# Patient Record
Sex: FEMALE | Race: WHITE | Employment: OTHER | URBAN - METROPOLITAN AREA
[De-identification: names, ages, dates, MRNs, and addresses within clinical notes are randomized per-mention and may not be internally consistent; named-entity substitution may affect disease eponyms.]

---

## 2021-10-26 ENCOUNTER — APPOINTMENT (OUTPATIENT)
Dept: GENERAL RADIOLOGY | Age: 86
DRG: 291 | End: 2021-10-26
Payer: MEDICARE

## 2021-10-26 ENCOUNTER — HOSPITAL ENCOUNTER (INPATIENT)
Age: 86
LOS: 5 days | Discharge: NURSING, REHAB OR LONG-TERM CARE | DRG: 291 | End: 2021-10-31
Attending: EMERGENCY MEDICINE | Admitting: INTERNAL MEDICINE
Payer: MEDICARE

## 2021-10-26 ENCOUNTER — APPOINTMENT (OUTPATIENT)
Dept: CT IMAGING | Age: 86
DRG: 291 | End: 2021-10-26
Payer: MEDICARE

## 2021-10-26 DIAGNOSIS — J96.01 ACUTE HYPOXEMIC RESPIRATORY FAILURE (HCC): Primary | ICD-10-CM

## 2021-10-26 DIAGNOSIS — J18.9 PNEUMONIA DUE TO INFECTIOUS ORGANISM, UNSPECIFIED LATERALITY, UNSPECIFIED PART OF LUNG: ICD-10-CM

## 2021-10-26 DIAGNOSIS — I48.91 ATRIAL FIBRILLATION, UNSPECIFIED TYPE (HCC): ICD-10-CM

## 2021-10-26 DIAGNOSIS — J90 PLEURAL EFFUSION: ICD-10-CM

## 2021-10-26 PROBLEM — J96.00 ACUTE RESPIRATORY FAILURE (HCC): Status: ACTIVE | Noted: 2021-10-26

## 2021-10-26 LAB
A/G RATIO: 0.9 (ref 1.1–2.2)
ALBUMIN SERPL-MCNC: 3.6 G/DL (ref 3.4–5)
ALP BLD-CCNC: 140 U/L (ref 40–129)
ALT SERPL-CCNC: 11 U/L (ref 10–40)
ANION GAP SERPL CALCULATED.3IONS-SCNC: 10 MMOL/L (ref 3–16)
AST SERPL-CCNC: 21 U/L (ref 15–37)
BASE EXCESS VENOUS: -2.2 MMOL/L (ref -3–3)
BASOPHILS ABSOLUTE: 0.1 K/UL (ref 0–0.2)
BASOPHILS RELATIVE PERCENT: 0.5 %
BILIRUB SERPL-MCNC: 0.5 MG/DL (ref 0–1)
BUN BLDV-MCNC: 20 MG/DL (ref 7–20)
CALCIUM SERPL-MCNC: 8.8 MG/DL (ref 8.3–10.6)
CARBOXYHEMOGLOBIN: 4.2 % (ref 0–1.5)
CHLORIDE BLD-SCNC: 103 MMOL/L (ref 99–110)
CO2: 22 MMOL/L (ref 21–32)
CREAT SERPL-MCNC: 0.6 MG/DL (ref 0.6–1.2)
EOSINOPHILS ABSOLUTE: 0.1 K/UL (ref 0–0.6)
EOSINOPHILS RELATIVE PERCENT: 0.4 %
GFR AFRICAN AMERICAN: >60
GFR NON-AFRICAN AMERICAN: >60
GLOBULIN: 4.1 G/DL
GLUCOSE BLD-MCNC: 161 MG/DL (ref 70–99)
HCO3 VENOUS: 22.4 MMOL/L (ref 23–29)
HCT VFR BLD CALC: 26.5 % (ref 36–48)
HEMOGLOBIN: 8.2 G/DL (ref 12–16)
LACTIC ACID, SEPSIS: 0.9 MMOL/L (ref 0.4–1.9)
LACTIC ACID: 2.7 MMOL/L (ref 0.4–2)
LYMPHOCYTES ABSOLUTE: 0.7 K/UL (ref 1–5.1)
LYMPHOCYTES RELATIVE PERCENT: 5 %
MCH RBC QN AUTO: 24.3 PG (ref 26–34)
MCHC RBC AUTO-ENTMCNC: 30.9 G/DL (ref 31–36)
MCV RBC AUTO: 78.7 FL (ref 80–100)
METHEMOGLOBIN VENOUS: 0.3 %
MONOCYTES ABSOLUTE: 1.3 K/UL (ref 0–1.3)
MONOCYTES RELATIVE PERCENT: 9.7 %
NEUTROPHILS ABSOLUTE: 11.7 K/UL (ref 1.7–7.7)
NEUTROPHILS RELATIVE PERCENT: 84.4 %
O2 SAT, VEN: 98 %
O2 THERAPY: ABNORMAL
PCO2, VEN: 37.5 MMHG (ref 40–50)
PDW BLD-RTO: 18.1 % (ref 12.4–15.4)
PH VENOUS: 7.39 (ref 7.35–7.45)
PLATELET # BLD: 353 K/UL (ref 135–450)
PMV BLD AUTO: 7.5 FL (ref 5–10.5)
PO2, VEN: 136.7 MMHG (ref 25–40)
POTASSIUM REFLEX MAGNESIUM: 4.1 MMOL/L (ref 3.5–5.1)
PRO-BNP: 2294 PG/ML (ref 0–449)
RBC # BLD: 3.37 M/UL (ref 4–5.2)
SARS-COV-2, NAAT: NOT DETECTED
SODIUM BLD-SCNC: 135 MMOL/L (ref 136–145)
SPECIMEN STATUS: NORMAL
TCO2 CALC VENOUS: 24 MMOL/L
TOTAL PROTEIN: 7.7 G/DL (ref 6.4–8.2)
TROPONIN: <0.01 NG/ML
WBC # BLD: 13.9 K/UL (ref 4–11)

## 2021-10-26 PROCEDURE — 2580000003 HC RX 258: Performed by: EMERGENCY MEDICINE

## 2021-10-26 PROCEDURE — 85025 COMPLETE CBC W/AUTO DIFF WBC: CPT

## 2021-10-26 PROCEDURE — 99284 EMERGENCY DEPT VISIT MOD MDM: CPT

## 2021-10-26 PROCEDURE — 6360000004 HC RX CONTRAST MEDICATION: Performed by: EMERGENCY MEDICINE

## 2021-10-26 PROCEDURE — U0003 INFECTIOUS AGENT DETECTION BY NUCLEIC ACID (DNA OR RNA); SEVERE ACUTE RESPIRATORY SYNDROME CORONAVIRUS 2 (SARS-COV-2) (CORONAVIRUS DISEASE [COVID-19]), AMPLIFIED PROBE TECHNIQUE, MAKING USE OF HIGH THROUGHPUT TECHNOLOGIES AS DESCRIBED BY CMS-2020-01-R: HCPCS

## 2021-10-26 PROCEDURE — 71260 CT THORAX DX C+: CPT

## 2021-10-26 PROCEDURE — 84484 ASSAY OF TROPONIN QUANT: CPT

## 2021-10-26 PROCEDURE — 93005 ELECTROCARDIOGRAM TRACING: CPT | Performed by: EMERGENCY MEDICINE

## 2021-10-26 PROCEDURE — 82803 BLOOD GASES ANY COMBINATION: CPT

## 2021-10-26 PROCEDURE — 70450 CT HEAD/BRAIN W/O DYE: CPT

## 2021-10-26 PROCEDURE — 71045 X-RAY EXAM CHEST 1 VIEW: CPT

## 2021-10-26 PROCEDURE — U0005 INFEC AGEN DETEC AMPLI PROBE: HCPCS

## 2021-10-26 PROCEDURE — 94761 N-INVAS EAR/PLS OXIMETRY MLT: CPT

## 2021-10-26 PROCEDURE — 2060000000 HC ICU INTERMEDIATE R&B

## 2021-10-26 PROCEDURE — 2700000000 HC OXYGEN THERAPY PER DAY

## 2021-10-26 PROCEDURE — 83880 ASSAY OF NATRIURETIC PEPTIDE: CPT

## 2021-10-26 PROCEDURE — 80053 COMPREHEN METABOLIC PANEL: CPT

## 2021-10-26 PROCEDURE — 6360000002 HC RX W HCPCS: Performed by: EMERGENCY MEDICINE

## 2021-10-26 PROCEDURE — 83605 ASSAY OF LACTIC ACID: CPT

## 2021-10-26 PROCEDURE — 87635 SARS-COV-2 COVID-19 AMP PRB: CPT

## 2021-10-26 PROCEDURE — 87040 BLOOD CULTURE FOR BACTERIA: CPT

## 2021-10-26 RX ORDER — SODIUM CHLORIDE, SODIUM LACTATE, POTASSIUM CHLORIDE, AND CALCIUM CHLORIDE .6; .31; .03; .02 G/100ML; G/100ML; G/100ML; G/100ML
1000 INJECTION, SOLUTION INTRAVENOUS ONCE
Status: COMPLETED | OUTPATIENT
Start: 2021-10-26 | End: 2021-10-26

## 2021-10-26 RX ORDER — SODIUM CHLORIDE 0.9 % (FLUSH) 0.9 %
5-40 SYRINGE (ML) INJECTION PRN
Status: DISCONTINUED | OUTPATIENT
Start: 2021-10-26 | End: 2021-10-31 | Stop reason: HOSPADM

## 2021-10-26 RX ORDER — SODIUM CHLORIDE 9 MG/ML
25 INJECTION, SOLUTION INTRAVENOUS PRN
Status: DISCONTINUED | OUTPATIENT
Start: 2021-10-26 | End: 2021-10-31 | Stop reason: HOSPADM

## 2021-10-26 RX ORDER — SODIUM CHLORIDE 0.9 % (FLUSH) 0.9 %
5-40 SYRINGE (ML) INJECTION EVERY 12 HOURS SCHEDULED
Status: DISCONTINUED | OUTPATIENT
Start: 2021-10-26 | End: 2021-10-31 | Stop reason: HOSPADM

## 2021-10-26 RX ORDER — LEVOFLOXACIN 5 MG/ML
750 INJECTION, SOLUTION INTRAVENOUS ONCE
Status: DISCONTINUED | OUTPATIENT
Start: 2021-10-26 | End: 2021-10-26 | Stop reason: CLARIF

## 2021-10-26 RX ADMIN — SODIUM CHLORIDE, POTASSIUM CHLORIDE, SODIUM LACTATE AND CALCIUM CHLORIDE 1000 ML: 600; 310; 30; 20 INJECTION, SOLUTION INTRAVENOUS at 22:31

## 2021-10-26 RX ADMIN — IOPAMIDOL 75 ML: 755 INJECTION, SOLUTION INTRAVENOUS at 23:42

## 2021-10-26 RX ADMIN — LEVOFLOXACIN 750 MG: 5 INJECTION, SOLUTION INTRAVENOUS at 22:34

## 2021-10-26 RX ADMIN — CEFEPIME 2000 MG: 2 INJECTION, POWDER, FOR SOLUTION INTRAMUSCULAR; INTRAVENOUS at 23:01

## 2021-10-26 NOTE — ED NOTES
Fall risk screening completed.  Fall risk bracelet applied to patient.  Non-skid socks provided and placed on patient. Reza Gifford fall risk is indicated using  dome light .  Based on score, a bed alarm was indicated and applied.  The call light is within the patient's reach, and instructions for use were provided.  The bed is in the lowest position with wheels locked.  The patient has been advised to notify staff, using the call light, if there is a need to get up or use restroom.  The patient verbalized understanding of safety precautions and how to contact staff for assistance.          Sai Vazquez RN  10/26/21 8904

## 2021-10-26 NOTE — ED NOTES
Bed: 01  Expected date:   Expected time:   Means of arrival:   Comments:   Sweetie Vazquez RN  10/26/21 1910

## 2021-10-27 LAB
A/G RATIO: 0.9 (ref 1.1–2.2)
ALBUMIN SERPL-MCNC: 3.7 G/DL (ref 3.4–5)
ALP BLD-CCNC: 138 U/L (ref 40–129)
ALT SERPL-CCNC: 12 U/L (ref 10–40)
AMMONIA: 17 UMOL/L (ref 11–51)
ANION GAP SERPL CALCULATED.3IONS-SCNC: 7 MMOL/L (ref 3–16)
AST SERPL-CCNC: 20 U/L (ref 15–37)
BASE EXCESS VENOUS: -0.6 MMOL/L (ref -3–3)
BASOPHILS ABSOLUTE: 0 K/UL (ref 0–0.2)
BASOPHILS RELATIVE PERCENT: 0.3 %
BILIRUB SERPL-MCNC: 0.4 MG/DL (ref 0–1)
BUN BLDV-MCNC: 15 MG/DL (ref 7–20)
CALCIUM SERPL-MCNC: 8.8 MG/DL (ref 8.3–10.6)
CARBOXYHEMOGLOBIN: 1.9 % (ref 0–1.5)
CHLORIDE BLD-SCNC: 103 MMOL/L (ref 99–110)
CO2: 25 MMOL/L (ref 21–32)
CREAT SERPL-MCNC: <0.5 MG/DL (ref 0.6–1.2)
EOSINOPHILS ABSOLUTE: 0.1 K/UL (ref 0–0.6)
EOSINOPHILS RELATIVE PERCENT: 1 %
FERRITIN: 66.8 NG/ML (ref 15–150)
FOLATE: 14.25 NG/ML (ref 4.78–24.2)
GFR AFRICAN AMERICAN: >60
GFR NON-AFRICAN AMERICAN: >60
GLOBULIN: 3.9 G/DL
GLUCOSE BLD-MCNC: 98 MG/DL (ref 70–99)
HCO3 VENOUS: 25.5 MMOL/L (ref 23–29)
HCT VFR BLD CALC: 26 % (ref 36–48)
HEMOGLOBIN: 8.1 G/DL (ref 12–16)
IRON SATURATION: 5 % (ref 15–50)
IRON: 14 UG/DL (ref 37–145)
LACTIC ACID, SEPSIS: 1 MMOL/L (ref 0.4–1.9)
LYMPHOCYTES ABSOLUTE: 0.9 K/UL (ref 1–5.1)
LYMPHOCYTES RELATIVE PERCENT: 9.4 %
MAGNESIUM: 2 MG/DL (ref 1.8–2.4)
MCH RBC QN AUTO: 24.7 PG (ref 26–34)
MCHC RBC AUTO-ENTMCNC: 31.1 G/DL (ref 31–36)
MCV RBC AUTO: 79.6 FL (ref 80–100)
METHEMOGLOBIN VENOUS: 0.6 %
MONOCYTES ABSOLUTE: 0.9 K/UL (ref 0–1.3)
MONOCYTES RELATIVE PERCENT: 9.4 %
NEUTROPHILS ABSOLUTE: 7.4 K/UL (ref 1.7–7.7)
NEUTROPHILS RELATIVE PERCENT: 79.9 %
O2 SAT, VEN: 60 %
O2 THERAPY: ABNORMAL
PCO2, VEN: 49.1 MMHG (ref 40–50)
PDW BLD-RTO: 17.8 % (ref 12.4–15.4)
PH VENOUS: 7.33 (ref 7.35–7.45)
PLATELET # BLD: 315 K/UL (ref 135–450)
PMV BLD AUTO: 7.1 FL (ref 5–10.5)
PO2, VEN: 34.6 MMHG (ref 25–40)
POTASSIUM REFLEX MAGNESIUM: 3.5 MMOL/L (ref 3.5–5.1)
PROCALCITONIN: 0.12 NG/ML (ref 0–0.15)
RAPID INFLUENZA  B AGN: NEGATIVE
RAPID INFLUENZA A AGN: NEGATIVE
RBC # BLD: 3.27 M/UL (ref 4–5.2)
SARS-COV-2: NOT DETECTED
SODIUM BLD-SCNC: 135 MMOL/L (ref 136–145)
TCO2 CALC VENOUS: 27 MMOL/L
TOTAL IRON BINDING CAPACITY: 286 UG/DL (ref 260–445)
TOTAL PROTEIN: 7.6 G/DL (ref 6.4–8.2)
VITAMIN B-12: 630 PG/ML (ref 211–911)
WBC # BLD: 9.3 K/UL (ref 4–11)

## 2021-10-27 PROCEDURE — 84145 PROCALCITONIN (PCT): CPT

## 2021-10-27 PROCEDURE — 80053 COMPREHEN METABOLIC PANEL: CPT

## 2021-10-27 PROCEDURE — 6360000002 HC RX W HCPCS: Performed by: EMERGENCY MEDICINE

## 2021-10-27 PROCEDURE — 6370000000 HC RX 637 (ALT 250 FOR IP): Performed by: INTERNAL MEDICINE

## 2021-10-27 PROCEDURE — 2700000000 HC OXYGEN THERAPY PER DAY

## 2021-10-27 PROCEDURE — 2060000000 HC ICU INTERMEDIATE R&B

## 2021-10-27 PROCEDURE — 94640 AIRWAY INHALATION TREATMENT: CPT

## 2021-10-27 PROCEDURE — 85025 COMPLETE CBC W/AUTO DIFF WBC: CPT

## 2021-10-27 PROCEDURE — 82728 ASSAY OF FERRITIN: CPT

## 2021-10-27 PROCEDURE — 2580000003 HC RX 258: Performed by: INTERNAL MEDICINE

## 2021-10-27 PROCEDURE — 82803 BLOOD GASES ANY COMBINATION: CPT

## 2021-10-27 PROCEDURE — 83550 IRON BINDING TEST: CPT

## 2021-10-27 PROCEDURE — 83540 ASSAY OF IRON: CPT

## 2021-10-27 PROCEDURE — 83605 ASSAY OF LACTIC ACID: CPT

## 2021-10-27 PROCEDURE — 82140 ASSAY OF AMMONIA: CPT

## 2021-10-27 PROCEDURE — 36415 COLL VENOUS BLD VENIPUNCTURE: CPT

## 2021-10-27 PROCEDURE — 6360000002 HC RX W HCPCS: Performed by: INTERNAL MEDICINE

## 2021-10-27 PROCEDURE — 2580000003 HC RX 258: Performed by: EMERGENCY MEDICINE

## 2021-10-27 PROCEDURE — 83735 ASSAY OF MAGNESIUM: CPT

## 2021-10-27 PROCEDURE — 94660 CPAP INITIATION&MGMT: CPT

## 2021-10-27 PROCEDURE — 94761 N-INVAS EAR/PLS OXIMETRY MLT: CPT

## 2021-10-27 PROCEDURE — 87804 INFLUENZA ASSAY W/OPTIC: CPT

## 2021-10-27 PROCEDURE — 82607 VITAMIN B-12: CPT

## 2021-10-27 PROCEDURE — 82746 ASSAY OF FOLIC ACID SERUM: CPT

## 2021-10-27 RX ORDER — MAGNESIUM SULFATE IN WATER 40 MG/ML
2000 INJECTION, SOLUTION INTRAVENOUS PRN
Status: DISCONTINUED | OUTPATIENT
Start: 2021-10-27 | End: 2021-10-31 | Stop reason: HOSPADM

## 2021-10-27 RX ORDER — POTASSIUM CHLORIDE 7.45 MG/ML
10 INJECTION INTRAVENOUS PRN
Status: DISCONTINUED | OUTPATIENT
Start: 2021-10-27 | End: 2021-10-29

## 2021-10-27 RX ORDER — FUROSEMIDE 10 MG/ML
40 INJECTION INTRAMUSCULAR; INTRAVENOUS 2 TIMES DAILY
Status: DISCONTINUED | OUTPATIENT
Start: 2021-10-27 | End: 2021-10-27

## 2021-10-27 RX ORDER — TAMSULOSIN HYDROCHLORIDE 0.4 MG/1
0.4 CAPSULE ORAL DAILY
Status: DISCONTINUED | OUTPATIENT
Start: 2021-10-27 | End: 2021-10-31 | Stop reason: HOSPADM

## 2021-10-27 RX ORDER — LEVOTHYROXINE SODIUM 0.05 MG/1
50 TABLET ORAL NIGHTLY
Status: DISCONTINUED | OUTPATIENT
Start: 2021-10-27 | End: 2021-10-31 | Stop reason: HOSPADM

## 2021-10-27 RX ORDER — FERROUS SULFATE 325(65) MG
325 TABLET ORAL
Status: DISCONTINUED | OUTPATIENT
Start: 2021-10-28 | End: 2021-10-31 | Stop reason: HOSPADM

## 2021-10-27 RX ORDER — DIGOXIN 0.25 MG/ML
500 INJECTION INTRAMUSCULAR; INTRAVENOUS ONCE
Status: COMPLETED | OUTPATIENT
Start: 2021-10-27 | End: 2021-10-27

## 2021-10-27 RX ORDER — ACETAMINOPHEN 650 MG/1
650 SUPPOSITORY RECTAL EVERY 6 HOURS PRN
Status: DISCONTINUED | OUTPATIENT
Start: 2021-10-27 | End: 2021-10-31 | Stop reason: HOSPADM

## 2021-10-27 RX ORDER — SODIUM CHLORIDE 0.9 % (FLUSH) 0.9 %
10 SYRINGE (ML) INJECTION PRN
Status: DISCONTINUED | OUTPATIENT
Start: 2021-10-27 | End: 2021-10-31 | Stop reason: HOSPADM

## 2021-10-27 RX ORDER — GUAIFENESIN 600 MG/1
600 TABLET, EXTENDED RELEASE ORAL 2 TIMES DAILY
Status: DISCONTINUED | OUTPATIENT
Start: 2021-10-27 | End: 2021-10-31 | Stop reason: HOSPADM

## 2021-10-27 RX ORDER — PROMETHAZINE HYDROCHLORIDE 25 MG/1
12.5 TABLET ORAL EVERY 6 HOURS PRN
Status: DISCONTINUED | OUTPATIENT
Start: 2021-10-27 | End: 2021-10-31 | Stop reason: HOSPADM

## 2021-10-27 RX ORDER — MONTELUKAST SODIUM 10 MG/1
10 TABLET ORAL NIGHTLY
Status: DISCONTINUED | OUTPATIENT
Start: 2021-10-27 | End: 2021-10-31 | Stop reason: HOSPADM

## 2021-10-27 RX ORDER — POLYETHYLENE GLYCOL 3350 17 G/17G
17 POWDER, FOR SOLUTION ORAL DAILY
Status: DISCONTINUED | OUTPATIENT
Start: 2021-10-27 | End: 2021-10-31 | Stop reason: HOSPADM

## 2021-10-27 RX ORDER — FUROSEMIDE 10 MG/ML
20 INJECTION INTRAMUSCULAR; INTRAVENOUS ONCE
Status: COMPLETED | OUTPATIENT
Start: 2021-10-27 | End: 2021-10-27

## 2021-10-27 RX ORDER — ALBUTEROL SULFATE 90 UG/1
2 AEROSOL, METERED RESPIRATORY (INHALATION) EVERY 6 HOURS PRN
Status: DISCONTINUED | OUTPATIENT
Start: 2021-10-27 | End: 2021-10-31 | Stop reason: HOSPADM

## 2021-10-27 RX ORDER — BUDESONIDE AND FORMOTEROL FUMARATE DIHYDRATE 160; 4.5 UG/1; UG/1
2 AEROSOL RESPIRATORY (INHALATION) 2 TIMES DAILY
Status: DISCONTINUED | OUTPATIENT
Start: 2021-10-27 | End: 2021-10-31 | Stop reason: HOSPADM

## 2021-10-27 RX ORDER — ONDANSETRON 2 MG/ML
4 INJECTION INTRAMUSCULAR; INTRAVENOUS EVERY 6 HOURS PRN
Status: DISCONTINUED | OUTPATIENT
Start: 2021-10-27 | End: 2021-10-31 | Stop reason: HOSPADM

## 2021-10-27 RX ORDER — SODIUM CHLORIDE 9 MG/ML
25 INJECTION, SOLUTION INTRAVENOUS PRN
Status: DISCONTINUED | OUTPATIENT
Start: 2021-10-27 | End: 2021-10-31 | Stop reason: HOSPADM

## 2021-10-27 RX ORDER — ACETAMINOPHEN 325 MG/1
650 TABLET ORAL EVERY 6 HOURS PRN
Status: DISCONTINUED | OUTPATIENT
Start: 2021-10-27 | End: 2021-10-31 | Stop reason: HOSPADM

## 2021-10-27 RX ORDER — SODIUM CHLORIDE 0.9 % (FLUSH) 0.9 %
10 SYRINGE (ML) INJECTION EVERY 12 HOURS SCHEDULED
Status: DISCONTINUED | OUTPATIENT
Start: 2021-10-27 | End: 2021-10-31 | Stop reason: HOSPADM

## 2021-10-27 RX ADMIN — GUAIFENESIN 600 MG: 600 TABLET, EXTENDED RELEASE ORAL at 08:23

## 2021-10-27 RX ADMIN — DIGOXIN 500 MCG: 250 INJECTION, SOLUTION INTRAMUSCULAR; INTRAVENOUS; PARENTERAL at 04:35

## 2021-10-27 RX ADMIN — LEVETIRACETAM 500 MG: 100 INJECTION, SOLUTION INTRAVENOUS at 21:41

## 2021-10-27 RX ADMIN — TIOTROPIUM BROMIDE INHALATION SPRAY 2 PUFF: 3.12 SPRAY, METERED RESPIRATORY (INHALATION) at 08:00

## 2021-10-27 RX ADMIN — MONTELUKAST SODIUM 10 MG: 10 TABLET ORAL at 19:37

## 2021-10-27 RX ADMIN — SALINE NASAL SPRAY 1 SPRAY: 1.5 SOLUTION NASAL at 18:01

## 2021-10-27 RX ADMIN — Medication 2 PUFF: at 08:00

## 2021-10-27 RX ADMIN — GUAIFENESIN 600 MG: 600 TABLET, EXTENDED RELEASE ORAL at 19:36

## 2021-10-27 RX ADMIN — Medication 2 PUFF: at 19:42

## 2021-10-27 RX ADMIN — LEVOTHYROXINE SODIUM 50 MCG: 0.05 TABLET ORAL at 19:37

## 2021-10-27 RX ADMIN — LEVETIRACETAM 1000 MG: 500 INJECTION, SOLUTION, CONCENTRATE INTRAVENOUS at 04:51

## 2021-10-27 RX ADMIN — LEVETIRACETAM 500 MG: 100 INJECTION, SOLUTION INTRAVENOUS at 08:08

## 2021-10-27 RX ADMIN — FUROSEMIDE 5 MG/HR: 10 INJECTION INTRAMUSCULAR; INTRAVENOUS at 05:21

## 2021-10-27 RX ADMIN — POLYETHYLENE GLYCOL 3350 17 G: 17 POWDER, FOR SOLUTION ORAL at 18:01

## 2021-10-27 RX ADMIN — ENOXAPARIN SODIUM 30 MG: 30 INJECTION SUBCUTANEOUS at 08:23

## 2021-10-27 RX ADMIN — VANCOMYCIN HYDROCHLORIDE 750 MG: 750 INJECTION, POWDER, LYOPHILIZED, FOR SOLUTION INTRAVENOUS at 00:09

## 2021-10-27 RX ADMIN — TAMSULOSIN HYDROCHLORIDE 0.4 MG: 0.4 CAPSULE ORAL at 08:23

## 2021-10-27 RX ADMIN — FUROSEMIDE 20 MG: 10 INJECTION, SOLUTION INTRAMUSCULAR; INTRAVENOUS at 04:35

## 2021-10-27 RX ADMIN — Medication 2 PUFF: at 05:45

## 2021-10-27 ASSESSMENT — PAIN SCALES - GENERAL
PAINLEVEL_OUTOF10: 0
PAINLEVEL_OUTOF10: 0

## 2021-10-27 NOTE — PROGRESS NOTES
[facility]  PROCEDURE NOTE    PREOPERATIVE DIAGNOSIS:  1.  Lipoma of the posterior neck    PREPROCEDURE PATHOLOGY:   none    POSTOPERATIVE DIAGNOSIS:   1.  Subfascial lipoma of the posterior neck    PROCEDURE PERFORMED:  1.  Excision of Subfascial lipoma from the posterior neck, 2.5 cm.    Surgeon:  Triston Joseph MD     Anesthesia:  1% lidocaine with epinephrine, 9.0 mL    Skin Preparation:  Chloraprep    Complications:  None     Estimated Blood Loss: None     Pre-procedure Antibiotics:  None     Specimens:   1. posterior neck mass      The lesion was placed directly into a specimen container(s), marked with the patient's name and sent for pathologic diagnosis.    Findings:  Moderately firm lipomatous mass of the posterior neck, deep to nataliya's fascia, resting directly on the posterior neck superficial musculature    PATIENT COUNSELING  Patient referred by self. The problematic lesion was identified with a mirror and confirmed by the patient. I discussed the recommended surgical options with the patient.  I offered observatino or excision of the Lipoma and closure.  The incisions and follow up care were discussed in detail with the patient.     Risks and benefits of Lipoma excision were discussed in detail with the patient.  Risks of excision include but are not limited to: bleeding, infection, poor cosmesis, necrosis of tissue, poor wound healing that requires additional wound care or surgery, pain, numbness, and injury to nerves, bloods vessels or adjacent structures.      It was discussed with the patient that if the pathologic margins still demonstrate cancer, further excision would be required.  In addition the risk of recurrence of the  Lipomawas discussed with the patient.      It was explained to the patient that all surgical excisions result in a permanent scar. On average scars take a year to fully mature.  After full maturation additional procedures or scar revisions may  Patient admitted to room 422 from ER. Patient oriented to room, call light, bed rails, phone, lights and bathroom. Patient instructed about the schedule of the day including: vital sign frequency, lab draws, possible tests, frequency of MD and staff rounds, including RN/MD rounding together at bedside, daily weights, and I &O's. Patient instructed about prescribed diet, how to use 8MENU, and television. bed alarm in place, patient aware of placement and reason. Telemetry box in place, patient aware of placement and reason. Bed locked, in lowest position, side rails up 2/4, call light within reach. Will continue to monitor. need to be performed.  Alternatives of the procedure were also discussed with the patient which include no excision.  All of the patient's questions were answered.         DESCRIPTION OF PROCEDURE:    The patient rested in the prone position.  Measurements and markings were made as indicated in the findings section above.  Anesthesia was administered followed by skin preparation.  A scalpel was used to incise the skin.  Sharp dissection was used to deepen the dissection into the deep subcutaneous layer and then t superficial musculaturehrough scarp's fascia.  The entire lesion was removed.  The wound was irrigated with sterile saline solution and hemostasis was obtained with pressure and minor cautery as needed.  Brian's fascia was closed with interrupted monocryl suture.   Tissue closure followed by placing 3-0 monocryl in the deep dermis in an interrupted fashion.  Skin closure was completed by placing 4-0 monocryl in a running intra-cuticular fashion.       Sterile dressings were applied and the patient was given both verbal and written forms of wound care instructions.      PLAN:   · We will call the patient with pathology results in 7-10 days.   · The patient will not require a follow-up in our office for suture removal.  · The patient will require a follow-up with dermatology and was instructed to make an appointment.

## 2021-10-27 NOTE — H&P
Hospital Medicine History & Physical      PCP: Anya Amador MD    Date of Admission: 10/26/2021    Date of Service: Pt seen/examined on 10/26/2021  Pt seen/examined face to face on and admitted as inpatient with expected LOS greater than two midnights due to medical therapy  Chief Complaint:    Chief Complaint   Patient presents with    Shortness of Breath     HX AFIB; CHF; COPD; FROM THE EH; GIVEN DUO NEB IN [place]        History Of Present Illness:      80 y.o. female who presented to [facility] with past medical hypertension, hyperlipidemia, osteoporosis, seizure disorder, hypothyroidism, CAD, presents to the ED due to worsening acute respiratory failure. Patient came from outside facility encephalopathic thus history is limited. Per chart review patient has been having worsening hypoxia satting in 60% when EMS arrived. Patient was eval patient had consistent hypoxia however was responsive to oxygen. On patient arrival patient was placed on BiPAP. Past Medical History:          Diagnosis Date    Anemia     C. difficile diarrhea     CAD (coronary artery disease)     CHF (congestive heart failure) (HCC)     COPD (chronic obstructive pulmonary disease) (HCC)     Depression     Hyperlipidemia     Hypertension     Hyponatremia     Osteoporosis     Seizures (HCC)     Thyroid disease     hypo       Past Surgical History:          Procedure Laterality Date    ANKLE SURGERY Left 10/27/2016    OPEN REDUCTION INTERNAL FIXATION BI MALLEOLAR FRACTURE LEFT ANKLE, REPAIR OF LACERATION LEFT LOWER LEG    FRACTURE SURGERY  10/27/2016    ORIF left ankle       Medications Prior to Admission:      Prior to Admission medications    Medication Sig Start Date End Date Taking?  Authorizing Provider   vitamin D (ERGOCALCIFEROL) 88981 UNITS capsule Take 1 capsule by mouth every 30 days 10/27/16   Best Landa MD   tamsulosin (FLOMAX) 0.4 MG capsule Take 1 capsule by mouth daily Pulse 121   Temp 98.4 °F (36.9 °C) (Axillary)   Resp 20   Wt 112 lb (50.8 kg)   SpO2 92%   BMI 18.08 kg/m²     General appearance:  mild acute distress, appears older than stated age  HEENT:   atraumatic, sclera anicteric, Conjunctivae clear. Neck: Supple,Trachea midline, no goiter  Respiratory:minimal accessory muscle usage, Normal respiratory effort. Crackles bilaterally, on BiPAP  Cardiovascular:  R irregularly irregular, capillary refill 2 seconds  Abdomen: Soft, non-tender, non-distended with normal bowel sounds. Musculoskeletal:  No clubbing, cyanosis. trace edema LE bilaterally. Skin: turgor normal.  No new rashes or lesions. Neurologic: Alert and oriented x0, somnolent but arouses to painful stimuli. GCS 9.   Labs:     Recent Labs     10/26/21  1914   WBC 13.9*   HGB 8.2*   HCT 26.5*        Recent Labs     10/26/21  1914   *   K 4.1      CO2 22   BUN 20   CREATININE 0.6   CALCIUM 8.8     Recent Labs     10/26/21  1914   AST 21   ALT 11   BILITOT 0.5   ALKPHOS 140*     No results for input(s): INR in the last 72 hours. Recent Labs     10/26/21  1914   TROPONINI <0.01       Urinalysis:      Lab Results   Component Value Date    NITRU Negative 10/27/2016    WBCUA 10-20 10/27/2016    BACTERIA 4+ 10/27/2016    RBCUA 0-2 10/27/2016    BLOODU SMALL 10/27/2016    SPECGRAV 1.020 10/27/2016    GLUCOSEU Negative 10/27/2016       Radiology:     CXR: I have reviewed the CXR with the following interpretation:   Perihilar edema and CHF  EKG:  I have reviewed the EKG with the following interpretation:   A. fib rate uncontrolled    CT HEAD WO CONTRAST   Final Result   No acute intracranial abnormality. CT CHEST PULMONARY EMBOLISM W CONTRAST   Final Result   1. No scan evidence for pulmonary embolus. 2. Bilateral pleural effusions with complete collapse of both lower lobes as   well as the middle lobe. XR CHEST PORTABLE   Final Result   Probable perihilar edema and CHF. Bilateral pleural effusions, right greater   than left with associated bibasilar volume loss. Underlying infiltrate not   excluded. ASSESSMENT AND PLAN:    Active Hospital Problems    Diagnosis Date Noted    Acute respiratory failure ([facility] [address] [J96.00] 10/26/2021     Acute encephalopathy:  etiology suspect metabolic in the setting of acute respiratory failure  CT head negative  Labs pending    Acute hypoxic respiratory failure: Secondary to pleural effusions  CTA showing no PE  Covid 19, flu rule out  Patient responded to BiPAP  Continue to monitor and wean off    Pleural effusions:  Etiology suspected cardiac  Empirically being treated with antibiotics pending pro-Bobo  IV Lasix, abx  check response    Suspected acute HFpEF:  Patient last echo was 2014 and was normal  Strict I's and O's, Daily weights  Lasix drip  Continued home medications  Heart failure protocol set ordered  Continue to check response    A. fib with RVR:  Suspected secondary to secondary to CHF  Patient given hemodynamically low borderline BP not anticoagulated with adequate renal function.   Will order digoxin for rate control at this time  Cardiology consulted, much appreciated    Microcytic anemia:  anemia panel ordered next    Malnutrition:  Dietitian consulted    Chronic medical conditions:  Hypothyroidism: Continue home medication  Asthma: continue home medication  Seizure: Restart home medication    Diet: cardiac diet    DVT Prophylaxis: lovenox    Dispo:   Expected LOS greater than two [address] [facility], [place]

## 2021-10-27 NOTE — CONSULTS
Comprehensive Nutrition Assessment    Type and Reason for Visit:  Initial, Positive Nutrition Screen, Consult    Nutrition Recommendations/Plan:   1. Continue regular, JAMES diet w/2000ml FR  2. Encourage po intakes  3. Start Ensure Compact TID   4. Monitor po intakes, nutrition adequacy, weights, pertinent labs, BMs    Nutrition Assessment:  Consulted for CHF education and oral nutritional supplements. CHF education not appropriate at this time d/t resident residing in [facility]. Pt 81yo female admitted 10/25 r/t acute respiratory failure. Currently on contact  and droplet precautions. Attempted to contact pt, sleeping at this time. Pt currently on regular, JAMES diet w/2000ml FR - liberalized from reg, carb control, cardiac d/t pt not having hx of  DM  and to promote po intake. Pt nurse reported no intakes with breakfast tray, stating pt was very lethergic this morning. Ensure Compact added TID. Will monitor acceptance. Wt = 101#. CLARISSE wt status d/t no wt hx in EMR. No reports of N/V/D. Will continue to monitor. Malnutrition Assessment:  Malnutrition Status: At risk for malnutrition (Comment)    Context:  Acute Illness     Findings of the 6 clinical characteristics of malnutrition:  Energy Intake:  Mild decrease in energy intake (Comment)    Estimated Daily Nutrient Needs:  Energy (kcal):  1782- 2070; Weight Used for Energy Requirements:  Ideal (59.1kg)     Protein (g):  71-89; Weight Used for Protein Requirements:   (1.2-1.5)        Fluid (ml/day):  2000ml per CHF recommendations;       Nutrition Related Findings:  trace, pitting BLE edema. Na 135. 6L O2. Wounds:  None (redness to sacrum)       Current Nutrition Therapies:    ADULT ORAL NUTRITION SUPPLEMENT; Lunch, Dinner, Breakfast; Standard 4 oz Oral Supplement  ADULT DIET; Regular;  No Added Salt (3-4 gm); 2000 ml    Anthropometric Measures:  · Height: 5' 6\" (167.6 cm)  · Current Body Weight: 101 lb 13.6 oz (46.2 kg)    · Ideal Body Weight: 130 lbs;   · BMI: 16.4  · BMI Categories: Underweight (BMI less than 22) age over 72       Nutrition Diagnosis:   · Inadequate energy intake related to inadequate protein-energy intake as evidenced by poor intake prior to admission, intake 0-25%, BMI      Nutrition Interventions:   Food and/or Nutrient Delivery:  Modify Current Diet, Start Oral Nutrition Supplement  Nutrition Education/Counseling:  Education not appropriate (Pt resides in SNF)   Coordination of Nutrition Care:  Continue to monitor while inpatient    Goals:  Pt will consume 50% or greater of meals and ONS during this admission       Nutrition Monitoring and Evaluation:   Behavioral-Environmental Outcomes:  None Identified   Food/Nutrient Intake Outcomes:  Food and Nutrient Intake, Supplement Intake  Physical Signs/Symptoms Outcomes:  Nutrition Focused Physical Findings, Weight, Biochemical Data     Discharge Planning:     Too soon to determine     Electronically signed by Fady Edgar on 10/27/21 at 2:34 PM EDT    Contact: [phone]

## 2021-10-27 NOTE — PROGRESS NOTES
RT in room to place patient on BIPAP machine. Patient refusing says she is not wearing that silly thing.

## 2021-10-27 NOTE — PLAN OF CARE
Problem: Falls - Risk of:  Goal: Will remain free from falls  Description: Will remain free from falls  10/27/2021 1038 by Amy Sosa RN  Outcome: Ongoing  10/27/2021 0206 by Ramiro Moss RN  Outcome: Ongoing  Goal: Absence of physical injury  Description: Absence of physical injury  10/27/2021 1038 by Amy Sosa RN  Outcome: Ongoing  10/27/2021 0206 by Ramiro Moss RN  Outcome: Ongoing     Problem: Skin Integrity:  Goal: Will show no infection signs and symptoms  Description: Will show no infection signs and symptoms  10/27/2021 1038 by Amy Sosa RN  Outcome: Ongoing  10/27/2021 0206 by Ramiro Moss RN  Outcome: Ongoing  Goal: Absence of new skin breakdown  Description: Absence of new skin breakdown  10/27/2021 1038 by Amy Sosa RN  Outcome: Ongoing  10/27/2021 0206 by Ramiro Moss RN  Outcome: Ongoing

## 2021-10-27 NOTE — PLAN OF CARE
Problem: Nutrition  Goal: Optimal nutrition therapy  Outcome: Ongoing  Note: Nutrition Problem #1: Inadequate energy intake  Intervention: Food and/or Nutrient Delivery: Modify Current Diet, Start Oral Nutrition Supplement  Nutritional Goals: Pt will consume 50% or greater of meals and ONS during this admission

## 2021-10-27 NOTE — PROGRESS NOTES
Report received from [facility]. Patient resting comfortably in bed. No signs of discomfort or distress. Bed is in lowest position, wheels locked, 2/2 side rails up. Bedside table and call light within reach. White board updated. Will continue to monitor patient. Anita Cade RN    11:41 PM  Shift assessment complete. (See findings in flowsheet). Med pass complete. (See MAR). VSS. Patient with no complaints at this time. Anita Cade RN    2:22 AM  Patient resting comfortably in bed with bipap in place. VSS. No needs at this time. Anita Cade RN    6:56 AM  Lab calling with critical result, K+ 2.9. PRN replacement orders already in place.  Anita Cade RN

## 2021-10-27 NOTE — PROGRESS NOTES
Sidney Cheatham about pt's code status. \"RM: 117 FYI: I looked at pt's summary report fro nursing home. It states as of 07/05/2021 that she is a DNR-CCA. Her report will be in her chart if you would like to take a look. \"    Pt's code status updated to [facility]

## 2021-10-27 NOTE — PROGRESS NOTES
4 Eyes Skin Assessment     The patient is being assess for   Admission    I agree that 2 RN's have performed a thorough Head to Toe Skin Assessment on the patient. ALL assessment sites listed below have been assessed. Areas assessed for pressure by both nurses:   [x]   Head, Face, and Ears   [x]   Shoulders, Back, and Chest, Abdomen  [x]   Arms, Elbows, and Hands   [x]   Coccyx, Sacrum, and Ischium  [x]   Legs, Feet, and Heels        Skin Assessed Under all Medical Devices by both nurses:  O2 device tubing              All Mepilex Borders were peeled back and area peeked at by both nurses:  Yes  Please list where Mepilex Borders are located:               **SHARE this note so that the co-signing nurse is able to place an eSignature**    Co-signer eSignature: {Esignature:[id number]}    Does the Patient have Skin Breakdown related to pressure?   No     (Insert Photo here***)         Terrance Prevention initiated:  NA   Wound Care Orders initiated:  NA      WOC nurse consulted for Pressure Injury (Stage 3,4, Unstageable, DTI, NWPT, Complex wounds)and New or Established Ostomies:  NA      Primary Nurse eSignature: Electronically signed by Otto Carver RN on 10/27/21 at 2:07 AM EDT

## 2021-10-27 NOTE — PROGRESS NOTES
Hospitalist Progress Note      PCP: Lisa Duque MD    Date of Admission: 10/26/2021    Chief Complaint: SOB    Hospital Course:   80 y.o. female who presented to [facility] with past medical hypertension, hyperlipidemia, osteoporosis, seizure disorder, hypothyroidism, CAD, presents to the ED due to worsening acute respiratory failure. Patient came from outside facility encephalopathic thus history is limited. Per chart review patient has been having worsening hypoxia satting in 60% when EMS arrived. Patient was eval patient had consistent hypoxia however was responsive to oxygen. On patient arrival patient was placed on BiPAP. Subjective: SOB improved. Good diuresis with lasix gtt. Remains confused.         Medications:  Reviewed    Infusion Medications    sodium chloride      furosemide (LASIX) 1mg/ml infusion 5 mg/hr (10/27/21 0521)    sodium chloride       Scheduled Medications    sodium chloride flush  10 mL IntraVENous 2 times per day    levetiracetam  500 mg IntraVENous Q12H    enoxaparin  30 mg SubCUTAneous Daily    guaiFENesin  600 mg Oral BID    budesonide-formoterol  2 puff Inhalation BID    levothyroxine  50 mcg Oral Nightly    montelukast  10 mg Oral Nightly    tamsulosin  0.4 mg Oral Daily    tiotropium  2 puff Inhalation Daily    sodium chloride flush  5-40 mL IntraVENous 2 times per day     PRN Meds: sodium chloride flush, sodium chloride, potassium chloride, magnesium sulfate, promethazine **OR** ondansetron, acetaminophen **OR** acetaminophen, albuterol sulfate HFA, sodium chloride flush, sodium chloride      Intake/Output Summary (Last 24 hours) at 10/27/2021 1518  Last data filed at 10/27/2021 1435  Gross per 24 hour   Intake 589 ml   Output 3450 ml   Net -2861 ml       Physical Exam Performed:    BP (!) 100/58   Pulse 82   Temp 97 °F (36.1 °C) (Axillary)   Resp 16   Ht 5' 6\" (1.676 m)   Wt 101 lb 13.6 oz (46.2 kg)   SpO2 100%   BMI 16.44 kg/m² General appearance: No apparent distress, appears stated age and cooperative. HEENT: Pupils equal, round, and reactive to light. Conjunctivae/corneas clear. Neck: Supple, with full range of motion. No jugular venous distention. Trachea midline. Respiratory:  Normal respiratory effort. Rales bilaterally without Wheezes/Rhonchi. Cardiovascular: regular rate, irregular rhythm with normal S1/S2 without murmurs, rubs or gallops. Abdomen: Soft, non-tender, non-distended with normal bowel sounds. Musculoskeletal: No clubbing, cyanosis or edema bilaterally. Full range of motion without deformity. Skin: Skin color, texture, turgor normal.  No rashes or lesions. Neurologic:  Neurovascularly intact without any focal sensory/motor deficits. Cranial nerves: II-XII intact, grossly non-focal.  Psychiatric: Alert but disoriented  Capillary Refill: Brisk,3 seconds, normal   Peripheral Pulses: +2 palpable, equal bilaterally       Labs:   Recent Labs     10/26/21  1914 10/27/21  0441   WBC 13.9* 9.3   HGB 8.2* 8.1*   HCT 26.5* 26.0*    315     Recent Labs     10/26/21  1914 10/27/21  0441   * 135*   K 4.1 3.5    103   CO2 22 25   BUN 20 15   CREATININE 0.6 <0.5*   CALCIUM 8.8 8.8     Recent Labs     10/26/21  1914 10/27/21  0441   AST 21 20   ALT 11 12   BILITOT 0.5 0.4   ALKPHOS 140* 138*     No results for input(s): INR in the last 72 hours. Recent Labs     10/26/21  1914   TROPONINI <0.01       Urinalysis:      Lab Results   Component Value Date    NITRU Negative 10/27/2016    WBCUA 10-20 10/27/2016    BACTERIA 4+ 10/27/2016    RBCUA 0-2 10/27/2016    BLOODU SMALL 10/27/2016    SPECGRAV 1.020 10/27/2016    GLUCOSEU Negative 10/27/2016       Radiology:  CT HEAD WO CONTRAST   Final Result   No acute intracranial abnormality. CT CHEST PULMONARY EMBOLISM W CONTRAST   Final Result   1. No scan evidence for pulmonary embolus.    2. Bilateral pleural effusions with complete collapse of both lower lobes as   well as the middle lobe. XR CHEST PORTABLE   Final Result   Probable perihilar edema and CHF. Bilateral pleural effusions, right greater   than left with associated bibasilar volume loss. Underlying infiltrate not   excluded. Assessment/Plan:    Active Hospital Problems    Diagnosis     Acute respiratory failure (HCC) [J96.00]      Acute heart failure, suspect diastolic  - echo ordered  - continue lasix gtt  - will consider cardiology consult pending echo results    Acute hypoxic respiratory failure  - 2/2 above  - wean O2 as able  - PCT WNL. No need for abx  - COVID 19 PCR pending    Acute metabolic encephalopathy  - 2/2 above  - CT head negative  - supportive care    Afib with RVR  - HR improved with digoxin, diuresis  - no AC prior to admission  - echo ordered    Asthma  - no evidence of exacerbation  - continue home inhalers    Fe deficient anemia  - Fe supplement ordered  - continue to monitor    Severe protein calorie malnutrition  - dietitian consult  - supplements ordered    Seizures  - continue home AED    Hypothyroidism  - continue home synthroid    DVT Prophylaxis: lovenox  Diet: ADULT ORAL NUTRITION SUPPLEMENT; Lunch, Dinner, Breakfast; Standard 4 oz Oral Supplement  ADULT DIET; Regular; No Added Salt (3-4 gm); 2000 ml  Code Status: DNR-CCA    PT/OT Eval Status: not ordered    Dispo - at least 2 days.  Will return to [facility] on discharge    Bridgette Fitzgerald MD

## 2021-10-27 NOTE — ED PROVIDER NOTES
Emergency Physician Note        Note Open Time: 12:15 AM EDT    Chief Complaint  Shortness of Breath (HX AFIB; CHF; COPD; FROM THE EH; GIVEN [facility]O NEB IN [facility])       History of Present Illness  Ronal Grover is a 80 y.o. female who presents to the ED for shortness of breath. EMS reports that they were called by nursing home because of acute onset of shortness of breath. They found the patient with extreme hypoxia, 60% pulse ox on room air and transported after giving nebs. The patient has improved. Patient does not give any history. History and review of systems limited by patient's respiratory distress and acuity    I have reviewed the following from the nursing documentation:      Prior to Admission medications    Medication Sig Start Date End Date Taking? Authorizing Provider   vitamin D (ERGOCALCIFEROL) 06229 UNITS capsule Take 1 capsule by mouth every 30 days 10/27/16   Radha Kiser MD   tamsulosin (FLOMAX) 0.4 MG capsule Take 1 capsule by mouth daily 10/30/16   Radha Kiser MD   levothyroxine (SYNTHROID) 50 MCG tablet Take 50 mcg by mouth nightly    Historical Provider, MD   alendronate (FOSAMAX) 70 MG tablet Take 70 mg by mouth every 7 days Every Saturday.     Historical Provider, MD   fluticasone-salmeterol (ADVAIR) 250-50 MCG/DOSE AEPB Inhale 1 puff into the lungs 2 times daily    Historical Provider, MD   tiotropium (SPIRIVA) 18 MCG inhalation capsule Inhale 18 mcg into the lungs daily    Historical Provider, MD   albuterol sulfate  (90 BASE) MCG/ACT inhaler Inhale 2 puffs into the lungs every 6 hours as needed for Wheezing    Historical Provider, MD   metoprolol tartrate (LOPRESSOR) 25 MG tablet Take 25 mg by mouth 2 times daily    Historical Provider, MD   guaiFENesin (MUCINEX) 600 MG extended release tablet Take 600 mg by mouth 2 times daily    Historical Provider, MD   melatonin 3 MG TABS tablet Take 2 mg by mouth daily    Historical Provider, MD   levETIRAcetam (KEPPRA) 250 MG tablet Take 250 mg by mouth daily    Historical Provider, MD   levETIRAcetam (KEPPRA) 250 MG tablet Take 500 mg by mouth nightly    Historical Provider, MD   montelukast (SINGULAIR) 10 MG tablet Take 10 mg by mouth nightly    Historical Provider, MD       Allergies as of 10/26/2021 - Fully Reviewed 10/26/2021   Allergen Reaction Noted    Atorvastatin  01/31/2015    Morphine  01/31/2015    Mushroom extract complex  01/31/2015    Pcn [penicillins]  01/31/2015    Polysorbate  01/31/2015    Sulfa antibiotics  01/31/2015    Theophylline er Hives 10/27/2016    Theophyllines  01/31/2015    Zocor [simvastatin]  01/31/2015       Past Medical History:   Diagnosis Date    Anemia     C. difficile diarrhea     CAD (coronary artery disease)     [facility]     COPD [facility]chronic obstructive pulmonary disease) [facility]     Depression     Hyperlipidemia     Hypertension     Hyponatremia     Osteoporosis     Seizures ([facility]     Thyroid disease     hypo        Surgical History:   Past Surgical History:   Procedure Laterality Date    ANKLE SURGERY Left 10/27/2016    OPEN REDUCTION INTERNAL FIXATION BI MALLEOLAR FRACTURE LEFT ANKLE, REPAIR OF LACERATION LEFT LOWER LEG    FRACTURE SURGERY  10/27/2016    ORIF left ankle        Family History:  History reviewed. No pertinent family history. Social History     Socioeconomic History    Marital status:       Spouse name: Not on file    Number of children: Not on file    Years of education: Not on file    Highest education level: Not on file   Occupational History    Not on file   Tobacco Use    Smoking status: Never Smoker    Smokeless tobacco: Never Used   Substance and Sexual Activity    Alcohol use: No    Drug use: No    Sexual activity: Not on file   Other Topics Concern    Not on file   Social History Narrative    Not on file     Social Determinants of Health     Financial Resource Strain:     Difficulty of Paying Living Expenses:    Food Insecurity:     Worried About Running Out of Food in the Last Year:     [address] in the Last Year:    Transportation Needs:     Lack of Transportation (Medical):  Lack of Transportation (Non-Medical):    Physical Activity:     Days of Exercise per Week:     Minutes of Exercise per Session:    Stress:     Feeling of Stress :    Social Connections:     Frequency of Communication with Friends and Family:     Frequency of Social Gatherings with Friends and Family:     Attends Hoahaoism Services:     Active Member of Clubs or Organizations:     Attends Club or Organization Meetings:     Marital Status:    Intimate Partner Violence:     Fear of Current or Ex-Partner:     Emotionally Abused:     Physically Abused:     Sexually Abused:        Nursing notes reviewed. ED Triage Vitals   Enc Vitals Group      BP 10/26/21 1916 (!) 138/102      Pulse 10/26/21 1916 117      Resp 10/26/21 1916 18      Temp 10/26/21 1913 98.2 °F (36.8 °C)      Temp Source 10/26/21 1913 Oral      SpO2 10/26/21 1916 98 %      Weight 10/26/21 2226 112 lb (50.8 kg)      Height --       Head Circumference --       Peak Flow --       Pain Score --       Pain Loc --       Pain Edu? --       Excl. in GC? --        GENERAL:  Awake, alert. Well developed, thin with moderate respiratory distress. HENT:  Normocephalic, Atraumatic, moist mucous membranes. EYES:  Pupils equal round and reactive to light, Conjunctiva normal, extraocular movements normal.  NECK:  No meningeal signs, Supple. CHEST: Tachycardic and irregularly irregular, chest wall non-tender. LUNGS: Diffuse rales bilaterally. Tachypneic. ABDOMEN:  Soft, non-tender, no rebound, rigidity or guarding, non-distended, normal bowel sounds. No costovertebral angle tenderness to palpation. BACK:  No tenderness.    EXTREMITIES:  Normal range of motion, bilateral lower extremity edema, no bony tenderness, no deformity, distal pulses present. SKIN: Warm, dry and intact. NEUROLOGIC: Awake and alert. Moving all extremities to command. LABS and DIAGNOSTIC RESULTS  EKG  The Ekg interpreted by me shows  atrial fibrillation with a rate of 125  Axis is   Normal  QTc is  normal  ST Segments: no acute change  Atrial fibrillation is new from prior EKG dated 10/26/2016    RADIOLOGY  X-RAYS:  I have reviewed radiologic plain film image(s). ALL OTHER NON-PLAIN FILM IMAGES SUCH AS CT, ULTRASOUND AND MRI HAVE BEEN READ BY THE RADIOLOGIST. CT HEAD WO CONTRAST   Final Result   No acute intracranial abnormality. XR CHEST PORTABLE   Final Result   Probable perihilar edema and CHF. Bilateral pleural effusions, right greater   than left with associated bibasilar volume loss. Underlying infiltrate not   excluded.          CT CHEST PULMONARY EMBOLISM W CONTRAST    (Results Pending)        LABS  Labs Reviewed   CBC WITH AUTO DIFFERENTIAL - Abnormal; Notable for the following components:       Result Value    WBC 13.9 (*)     RBC 3.37 (*)     Hemoglobin 8.2 (*)     Hematocrit 26.5 (*)     MCV 78.7 (*)     MCH 24.3 (*)     MCHC 30.9 (*)     RDW 18.1 (*)     Neutrophils Absolute 11.7 (*)     Lymphocytes Absolute 0.7 (*)     All other components within normal limits    Narrative:     Performed at:  [address],  [place], [address] [employer]   Phone [phone]   COMPREHENSIVE METABOLIC PANEL W/ REFLEX TO MG FOR LOW K - Abnormal; Notable for the following components:    Sodium 135 (*)     Glucose 161 (*)     Albumin/Globulin Ratio 0.9 (*)     Alkaline Phosphatase 140 (*)     All other components within normal limits    Narrative:     Performed at:  [address] [place] [address] [address],  [place], [address] [employer]   Phone [phone] - Abnormal; Notable for the following components:    Pro-BNP 2,294 (*)     All other components within normal limits    Narrative: Performed at:  [address], [facility]   Phone [phone]   BLOOD GAS, VENOUS - Abnormal; Notable for the following components:    pCO2, Timmy 37.5 (*)     pO2, Timmy 136.7 (*)     HCO3, Venous 22.4 (*)     Carboxyhemoglobin 4.2 (*)     All other components within normal limits    Narrative:     Performed at:  [address], [facility]   Phone [phone]   LACTIC ACID, PLASMA - Abnormal; Notable for the following components:    Lactic Acid 2.7 (*)     All other components within normal limits    Narrative:     Performed at:  [address], [facility]   Phone [phone], RAPID    Narrative:     Performed at:  [place] [address] [employer]   Phone [phone]   CULTURE, BLOOD 1   CULTURE, BLOOD 2   TROPONIN    Narrative:     Performed at:  [place] [address] [employer]   Phone [phone]   SAMPLE POSSIBLE BLOOD BANK TESTING    Narrative:     Performed at:  [place] [address] [employer]   Phone [phone]   LACTATE, SEPSIS    Narrative:     Performed at:  [place] [address] [employer]   Phone [phone]   LACTATE, SEPSIS     MEDICAL DECISION MAKING        Patient's atrial fibrillation improved significantly with respiratory status and did not require any Cardizem treatment. Given the patient's tachycardia and leukocytosis I believe it is possible that she has a pneumonia causing sepsis. This reason I provided her with broad-spectrum antibiotics. Also it seems that she has significantly elevated fluid status at this time and shows evidence of CHF.   For this reason I did not give her a large IV fluid bolus. The total Critical Care time is 45 minutes which excludes separately billable procedures. The critical care was concerning treatment of respiratory distress with noninvasive positive pressure ventilation. This time is exclusive of any time documented by any other providers. I spoke with Dr. Jamaal Parham. We thoroughly discussed the history, physical exam, laboratory and imaging studies, as well as, emergency department course. Based upon that discussion, we've decided to admit Mario Alberto Marcelino for further observation and evaluation of Sergei Raygoza's dyspnea. As I have deemed necessary from their history, physical, and studies, I have considered and evaluated Mario Alberto Marcelino for the following diagnoses:  ACUTE CORONARY SYNDROME, CHRONIC OBSTRUCTIVE PULMONARY DISEASE, CONGESTIVE HEART FAILURE, PERICARDIAL TAMPONADE, PNEUMONIA, PNEUMOTHORAX, PULMONARY EMBOLISM, SEPSIS, and THORACIC DISSECTION. FINAL IMPRESSION  1. Acute hypoxemic respiratory failure (HCC)    2. Pleural effusion    3. Pneumonia due to infectious organism, unspecified laterality, unspecified part of lung    4. Atrial fibrillation, unspecified type ([address]        Vitals:  Blood pressure 119/82, pulse 103, temperature 98.2 °F (36.8 °C), temperature source Axillary, resp. rate 18, weight 112 lb (50.8 kg), SpO2 99 %. Disposition  Pt is in stable condition upon Admit to telemetry. This chart was generated using the [employer] dictation system. I created this record but it may contain dictation errors.           Nelson Gallagher MD  10/27/21 2107

## 2021-10-27 NOTE — PROGRESS NOTES
10/27/21 0459   RT Protocol   History Pulmonary Disease 0   Respiratory pattern 6   Breath sounds 2   Cough 0   Indications for Bronchodilator Therapy On home bronchodilators   Bronchodilator Assessment Score 8

## 2021-10-27 NOTE — CARE COORDINATION
CASE MANAGEMENT INITIAL ASSESSMENT      Reviewed chart and completed assessment via telephone with:  Explained Case Management role/services. Left VM for AMALIA Sanchez [place] at [phone]    Primary contact information:see below    Health Care Decision Maker :   Primary Decision Maker: Barrett Beauchamp - Other - [phone]    Secondary Decision Maker: Alesia Reynolds - [place] - [phone]          Can this person be reached and be able to respond quickly, such as within a few minutes or hours? Yes      Admit date/status:Inpatient  Diagnosis: Acute respiratory failure   Is this a Readmission?:  No      Insurance: [employer] required for SNF: No       3 night stay required: No waived due to pandemic    Living arrangements, Adls, care needs, prior to admission:LTC at [facility]: TBD    Durable Medical Equipment at home:  Defer to [facility]    Services in the home and/or outpatient, prior to admission:LTC resident    PT/OT recs:Not seen    Hospital Exemption Notification (HEN):On file in system    Barriers to discharge:None    Plan/comments: To return to [facility] when medically stable for discharge. Covid rule out/ covid test pending.      ECOC on chart for MD signature

## 2021-10-27 NOTE — CONSULTS
Pharmacy Note  Vancomycin Consult    Inocencia Orozco is a 80 y.o. female started on Vancomycin for HAP; consult received from Dr. Steven Milner to manage therapy. Also receiving the following antibiotics: cefepime. Allergies:  Atorvastatin, Morphine, Mushroom extract complex, Pcn [penicillins], Polysorbate, Sulfa antibiotics, Theophylline er, Theophyllines, and Zocor [simvastatin]     Recent Labs     10/26/21  1914 10/27/21  0441   CREATININE 0.6 <0.5*     Recent Labs     10/26/21  1914 10/27/21  0441   WBC 13.9* 9.3     CrCl cannot be calculated (This lab value cannot be used to calculate CrCl because it is not a number: <0.5). No intake or output data in the 24 hours ending 10/27/21 0535  Wt Readings from Last 1 Encounters:   10/27/21 101 lb 13.6 oz (46.2 kg)       Body mass index is 16.44 kg/m². Culture Date      Source                       Results      Loading dose (critically ill or in ICU, require dialysis or renal replacement therapy): Vancomycin 25 mg/kg IVPB x 1 (maximum 3000 mg). Maintenance dose: 15 mg/kg (maximum: 2000 mg/dose and 4500 mg/day) starting at the next dosing interval determined by renal function  Pulse dose: fluctuating renal function, JOAN, ESRD   Goal Vancomycin trough: 10-20 mcg/mL   Goal Vancomycin AUC: 400-600     Assessment/Plan:  Will initiate Vancomycin 750 mg IV every 18 hours. Calculated  mg/L.hr. Vancomycin trough ordered for 10/29 at 0430. Timing of next level will be determined based on culture results, renal function, and clinical response. Thank you for the consult.   Natalia Liu, PharmD    10/27/2021 5:39 AM

## 2021-10-28 LAB
A/G RATIO: 0.8 (ref 1.1–2.2)
ALBUMIN SERPL-MCNC: 3.1 G/DL (ref 3.4–5)
ALP BLD-CCNC: 125 U/L (ref 40–129)
ALT SERPL-CCNC: 11 U/L (ref 10–40)
ANION GAP SERPL CALCULATED.3IONS-SCNC: 14 MMOL/L (ref 3–16)
AST SERPL-CCNC: 21 U/L (ref 15–37)
BASOPHILS ABSOLUTE: 0 K/UL (ref 0–0.2)
BASOPHILS RELATIVE PERCENT: 0.3 %
BILIRUB SERPL-MCNC: 0.3 MG/DL (ref 0–1)
BUN BLDV-MCNC: 18 MG/DL (ref 7–20)
CALCIUM SERPL-MCNC: 8.1 MG/DL (ref 8.3–10.6)
CHLORIDE BLD-SCNC: 96 MMOL/L (ref 99–110)
CO2: 28 MMOL/L (ref 21–32)
CREAT SERPL-MCNC: 0.6 MG/DL (ref 0.6–1.2)
EKG ATRIAL RATE: 147 BPM
EKG DIAGNOSIS: NORMAL
EKG Q-T INTERVAL: 344 MS
EKG QRS DURATION: 76 MS
EKG QTC CALCULATION (BAZETT): 496 MS
EKG R AXIS: 76 DEGREES
EKG T AXIS: -66 DEGREES
EKG VENTRICULAR RATE: 125 BPM
EOSINOPHILS ABSOLUTE: 0.1 K/UL (ref 0–0.6)
EOSINOPHILS RELATIVE PERCENT: 1.9 %
GFR AFRICAN AMERICAN: >60
GFR NON-AFRICAN AMERICAN: >60
GLOBULIN: 3.9 G/DL
GLUCOSE BLD-MCNC: 91 MG/DL (ref 70–99)
HCT VFR BLD CALC: 25.1 % (ref 36–48)
HEMOGLOBIN: 7.9 G/DL (ref 12–16)
LYMPHOCYTES ABSOLUTE: 0.7 K/UL (ref 1–5.1)
LYMPHOCYTES RELATIVE PERCENT: 9.1 %
MAGNESIUM: 1.8 MG/DL (ref 1.8–2.4)
MCH RBC QN AUTO: 24.6 PG (ref 26–34)
MCHC RBC AUTO-ENTMCNC: 31.6 G/DL (ref 31–36)
MCV RBC AUTO: 77.8 FL (ref 80–100)
MONOCYTES ABSOLUTE: 0.7 K/UL (ref 0–1.3)
MONOCYTES RELATIVE PERCENT: 9.9 %
NEUTROPHILS ABSOLUTE: 5.9 K/UL (ref 1.7–7.7)
NEUTROPHILS RELATIVE PERCENT: 78.8 %
PDW BLD-RTO: 17.5 % (ref 12.4–15.4)
PLATELET # BLD: 314 K/UL (ref 135–450)
PMV BLD AUTO: 8 FL (ref 5–10.5)
POTASSIUM REFLEX MAGNESIUM: 2.9 MMOL/L (ref 3.5–5.1)
RBC # BLD: 3.23 M/UL (ref 4–5.2)
SODIUM BLD-SCNC: 138 MMOL/L (ref 136–145)
TOTAL PROTEIN: 7 G/DL (ref 6.4–8.2)
WBC # BLD: 7.5 K/UL (ref 4–11)

## 2021-10-28 PROCEDURE — 94660 CPAP INITIATION&MGMT: CPT

## 2021-10-28 PROCEDURE — 6370000000 HC RX 637 (ALT 250 FOR IP): Performed by: INTERNAL MEDICINE

## 2021-10-28 PROCEDURE — 36415 COLL VENOUS BLD VENIPUNCTURE: CPT

## 2021-10-28 PROCEDURE — 94640 AIRWAY INHALATION TREATMENT: CPT

## 2021-10-28 PROCEDURE — 2580000003 HC RX 258: Performed by: INTERNAL MEDICINE

## 2021-10-28 PROCEDURE — 93010 ELECTROCARDIOGRAM REPORT: CPT | Performed by: INTERNAL MEDICINE

## 2021-10-28 PROCEDURE — 85025 COMPLETE CBC W/AUTO DIFF WBC: CPT

## 2021-10-28 PROCEDURE — 94761 N-INVAS EAR/PLS OXIMETRY MLT: CPT

## 2021-10-28 PROCEDURE — 2060000000 HC ICU INTERMEDIATE R&B

## 2021-10-28 PROCEDURE — 2700000000 HC OXYGEN THERAPY PER DAY

## 2021-10-28 PROCEDURE — 6360000002 HC RX W HCPCS: Performed by: INTERNAL MEDICINE

## 2021-10-28 PROCEDURE — 80053 COMPREHEN METABOLIC PANEL: CPT

## 2021-10-28 PROCEDURE — 83735 ASSAY OF MAGNESIUM: CPT

## 2021-10-28 RX ADMIN — POTASSIUM CHLORIDE 10 MEQ: 7.46 INJECTION, SOLUTION INTRAVENOUS at 12:49

## 2021-10-28 RX ADMIN — ENOXAPARIN SODIUM 30 MG: 30 INJECTION SUBCUTANEOUS at 09:13

## 2021-10-28 RX ADMIN — LEVOTHYROXINE SODIUM 50 MCG: 0.05 TABLET ORAL at 20:45

## 2021-10-28 RX ADMIN — TIOTROPIUM BROMIDE INHALATION SPRAY 2 PUFF: 3.12 SPRAY, METERED RESPIRATORY (INHALATION) at 07:54

## 2021-10-28 RX ADMIN — GUAIFENESIN 600 MG: 600 TABLET, EXTENDED RELEASE ORAL at 09:13

## 2021-10-28 RX ADMIN — POTASSIUM CHLORIDE 10 MEQ: 7.46 INJECTION, SOLUTION INTRAVENOUS at 11:20

## 2021-10-28 RX ADMIN — POLYETHYLENE GLYCOL 3350 17 G: 17 POWDER, FOR SOLUTION ORAL at 09:14

## 2021-10-28 RX ADMIN — TAMSULOSIN HYDROCHLORIDE 0.4 MG: 0.4 CAPSULE ORAL at 09:13

## 2021-10-28 RX ADMIN — POTASSIUM CHLORIDE 10 MEQ: 7.46 INJECTION, SOLUTION INTRAVENOUS at 07:31

## 2021-10-28 RX ADMIN — GUAIFENESIN 600 MG: 600 TABLET, EXTENDED RELEASE ORAL at 20:44

## 2021-10-28 RX ADMIN — LEVETIRACETAM 500 MG: 100 INJECTION, SOLUTION INTRAVENOUS at 21:15

## 2021-10-28 RX ADMIN — Medication 2 PUFF: at 07:54

## 2021-10-28 RX ADMIN — SODIUM CHLORIDE, PRESERVATIVE FREE 10 ML: 5 INJECTION INTRAVENOUS at 09:15

## 2021-10-28 RX ADMIN — POTASSIUM CHLORIDE 10 MEQ: 7.46 INJECTION, SOLUTION INTRAVENOUS at 09:30

## 2021-10-28 RX ADMIN — FUROSEMIDE 5 MG/HR: 10 INJECTION INTRAMUSCULAR; INTRAVENOUS at 02:20

## 2021-10-28 RX ADMIN — FERROUS SULFATE TAB 325 MG (65 MG ELEMENTAL FE) 325 MG: 325 (65 FE) TAB at 09:13

## 2021-10-28 RX ADMIN — MONTELUKAST SODIUM 10 MG: 10 TABLET ORAL at 20:44

## 2021-10-28 RX ADMIN — LEVETIRACETAM 500 MG: 100 INJECTION, SOLUTION INTRAVENOUS at 09:15

## 2021-10-28 ASSESSMENT — PAIN SCALES - GENERAL
PAINLEVEL_OUTOF10: 0
PAINLEVEL_OUTOF10: 0

## 2021-10-28 NOTE — PROGRESS NOTES
10/28/21 0005   NIV Type   $NIV $Daily Charge   Skin Protection for O2 Device Yes   Location Nose   NIV Started/Stopped On   Equipment Type V60   Mode Bilevel   Mask Type Full face mask   Mask Size Medium   Settings/Measurements   IPAP 12 cmH20   CPAP/EPAP 6 cmH2O   Rate Ordered 15   Resp 27   FiO2  100 %   I Time/ I Time % 1 s   Vt Exhaled 384 mL   Minute Volume 9.1 Liters   Mask Leak (lpm) 2 lpm   Comfort Level Good   Using Accessory Muscles Yes   SpO2 96   Alarm Settings   Alarms On Y   Press Low Alarm 6 cmH2O   High Pressure Alarm 30 cmH2O   Delay Alarm 20 sec(s)   Resp Rate Low Alarm 6   High Respiratory Rate 40 br/min

## 2021-10-28 NOTE — PROGRESS NOTES
Report received from [facility]. Patient resting comfortably in bed. No signs of discomfort or distress. Bed is in lowest position, wheels locked, 2/2 side rails up. Bedside table and call light within reach. White board updated. Will continue to monitor patient. Dali Henao RN    11:34 PM  Patient resting comfortably in bed. No needs at this time. Dali Henao RN    4:15 AM  VSS. Patient resting comfortably in bed. No needs at this time.  Dali Henao RN

## 2021-10-28 NOTE — PLAN OF CARE
Nutrition Problem #1: Inadequate energy intake  Intervention: Food and/or Nutrient Delivery: Continue Current Diet, Continue Oral Nutrition Supplement  Nutritional Goals: Pt will consume 50% or greater of meals and ONS during this admission

## 2021-10-28 NOTE — PROGRESS NOTES
Continue to monitor while inpatient    Goals:  Pt will consume 50% or greater of meals and ONS during this admission       Nutrition Monitoring and Evaluation:   Behavioral-Environmental Outcomes:  None Identified   Food/Nutrient Intake Outcomes:  Food and Nutrient Intake, Supplement Intake  Physical Signs/Symptoms Outcomes:  Nutrition Focused Physical Findings, Weight, Biochemical Data     Discharge Planning:     Too soon to determine     Electronically signed by Domenico Rodriguez, [address],  on 10/28/21 at 4:09 PM EDT    Contact: [phone]

## 2021-10-28 NOTE — PROGRESS NOTES
10/28/21 0344   NIV Type   NIV Started/Stopped On   Equipment Type V60   Mode Bilevel   Mask Type Full face mask   Mask Size Medium   Settings/Measurements   IPAP 12 cmH20   CPAP/EPAP 6 cmH2O   Rate Ordered 15   Resp 17   FiO2  45 %   I Time/ I Time % 1 s   Vt Exhaled 320 mL   Minute Volume 5.1 Liters   Mask Leak (lpm) 2 lpm   Comfort Level Good   Using Accessory Muscles No   Alarm Settings   Alarms On Y   Oxygen Therapy/Pulse Ox   SpO2 95 %

## 2021-10-28 NOTE — PLAN OF CARE
Problem: Falls - Risk of:  Goal: Will remain free from falls  Description: Will remain free from falls  Outcome: Met This Shift  Goal: Absence of physical injury  Description: Absence of physical injury  Outcome: Met This Shift     Problem: Skin Integrity:  Goal: Will show no infection signs and symptoms  Description: Will show no infection signs and symptoms  Outcome: Ongoing  Goal: Absence of new skin breakdown  Description: Absence of new skin breakdown  Outcome: Ongoing     Problem: Nutrition  Goal: Optimal nutrition therapy  10/28/2021 1843 by Risa Jimenez RN  Outcome: Ongoing  10/28/2021 1613 by Yan Cottrell RD, LD  Outcome: Ongoing

## 2021-10-28 NOTE — CARE COORDINATION
Per MD progress notes patient remiains confused. Good diuresis on lasix gtt for acute heart failure. On 4 liters O2 NC. Severe protein calorie malnutrition. Supplements ordered. Covid PCR negative. Patient is long term at [facility] and the current plan will be to return there at discharge.

## 2021-10-28 NOTE — PROGRESS NOTES
Hospitalist Progress Note      PCP: Laure Martin MD    Date of Admission: 10/26/2021    Chief Complaint: SOB    Hospital Course:   80 y.o. female who presented to [facility] with past medical hypertension, hyperlipidemia, osteoporosis, seizure disorder, hypothyroidism, CAD, presents to the ED due to worsening acute respiratory failure. Patient came from outside facility encephalopathic thus history is limited. Per chart review patient has been having worsening hypoxia satting in 60% when EMS arrived. Patient was eval patient had consistent hypoxia however was responsive to oxygen. On patient arrival patient was placed on BiPAP. Subjective: SOB improved. Good diuresis with lasix gtt. Remains confused.         Medications:  Reviewed    Infusion Medications    sodium chloride      furosemide (LASIX) 1mg/ml infusion 5 mg/hr (10/28/21 0220)    sodium chloride       Scheduled Medications    sodium chloride flush  10 mL IntraVENous 2 times per day    levetiracetam  500 mg IntraVENous Q12H    enoxaparin  30 mg SubCUTAneous Daily    guaiFENesin  600 mg Oral BID    budesonide-formoterol  2 puff Inhalation BID    levothyroxine  50 mcg Oral Nightly    montelukast  10 mg Oral Nightly    tamsulosin  0.4 mg Oral Daily    tiotropium  2 puff Inhalation Daily    ferrous sulfate  325 mg Oral Daily with breakfast    polyethylene glycol  17 g Oral Daily    sodium chloride flush  5-40 mL IntraVENous 2 times per day     PRN Meds: sodium chloride flush, sodium chloride, potassium chloride, magnesium sulfate, promethazine **OR** ondansetron, acetaminophen **OR** acetaminophen, albuterol sulfate HFA, sodium chloride, sodium chloride flush, sodium chloride      Intake/Output Summary (Last 24 hours) at 10/28/2021 1422  Last data filed at 10/28/2021 1401  Gross per 24 hour   Intake 383 ml   Output 3100 ml   Net -2717 ml       Physical Exam Performed:    /70   Pulse 93   Temp 98.3 °F (36.8 °C) (Oral) Resp 16   Ht 5' 6\" (1.676 m)   Wt 101 lb 13.6 oz (46.2 kg)   SpO2 93%   BMI 16.44 kg/m²     General appearance: No apparent distress, appears stated age and cooperative. HEENT: Pupils equal, round, and reactive to light. Conjunctivae/corneas clear. Neck: Supple, with full range of motion. No jugular venous distention. Trachea midline. Respiratory:  Normal respiratory effort. Rales bilaterally without Wheezes/Rhonchi. Cardiovascular: regular rate, irregular rhythm with normal S1/S2 without murmurs, rubs or gallops. Abdomen: Soft, non-tender, non-distended with normal bowel sounds. Musculoskeletal: No clubbing, cyanosis or edema bilaterally. Full range of motion without deformity. Skin: Skin color, texture, turgor normal.  No rashes or lesions. Neurologic:  Neurovascularly intact without any focal sensory/motor deficits. Cranial nerves: II-XII intact, grossly non-focal.  Psychiatric: Alert but disoriented  Capillary Refill: Brisk,3 seconds, normal   Peripheral Pulses: +2 palpable, equal bilaterally       Labs:   Recent Labs     10/26/21  1914 10/27/21  0441 10/28/21  0425   WBC 13.9* 9.3 7.5   HGB 8.2* 8.1* 7.9*   HCT 26.5* 26.0* 25.1*    315 314     Recent Labs     10/26/21  1914 10/27/21  0441 10/28/21  0425   * 135* 138   K 4.1 3.5 2.9*    103 96*   CO2 22 25 28   BUN 20 15 18   CREATININE 0.6 <0.5* 0.6   CALCIUM 8.8 8.8 8.1*     Recent Labs     10/26/21  1914 10/27/21  0441 10/28/21  0425   AST 21 20 21   ALT 11 12 11   BILITOT 0.5 0.4 0.3   ALKPHOS 140* 138* 125     No results for input(s): INR in the last 72 hours.   Recent Labs     10/26/21  1914   TROPONINI <0.01       Urinalysis:      Lab Results   Component Value Date    NITRU Negative 10/27/2016    WBCUA 10-20 10/27/2016    BACTERIA 4+ 10/27/2016    RBCUA 0-2 10/27/2016    BLOODU SMALL 10/27/2016    SPECGRAV 1.020 10/27/2016    GLUCOSEU Negative 10/27/2016       Radiology:  CT HEAD WO CONTRAST   Final Result   No acute intracranial abnormality. CT CHEST PULMONARY EMBOLISM W CONTRAST   Final Result   1. No scan evidence for pulmonary embolus. 2. Bilateral pleural effusions with complete collapse of both lower lobes as   well as the middle lobe. XR CHEST PORTABLE   Final Result   Probable perihilar edema and CHF. Bilateral pleural effusions, right greater   than left with associated bibasilar volume loss. Underlying infiltrate not   excluded. Assessment/Plan:    Active Hospital Problems    Diagnosis     Acute respiratory failure (HCC) [J96.00]      Acute heart failure, suspect diastolic  - echo ordered  - continue lasix gtt. Will likely stop tomorrow  - will consider cardiology consult pending echo results    Acute hypoxic respiratory failure  - 2/2 above  - wean O2 as able  - PCT WNL. No need for abx  - COVID 19 PCR negative    Acute metabolic encephalopathy  - 2/2 above  - CT head negative  - supportive care    Afib with RVR  - HR improved with digoxin, diuresis  - no AC prior to admission  - echo ordered    Asthma  - no evidence of exacerbation  - continue home inhalers    Fe deficient anemia  - Fe supplement ordered  - continue to monitor    Severe protein calorie malnutrition  - dietitian consult  - supplements ordered    Seizures  - continue home AED    Hypothyroidism  - continue home synthroid    DVT Prophylaxis: lovenox  Diet: ADULT ORAL NUTRITION SUPPLEMENT; Lunch, Dinner, Breakfast; Standard 4 oz Oral Supplement  ADULT DIET; Regular; No Added Salt (3-4 gm); 2000 ml  Code Status: DNR-CCA    PT/OT Eval Status: not ordered    Dispo - 1-2 days.  Will return to [facility] on discharge    Felisha Vaughan MD

## 2021-10-28 NOTE — PROGRESS NOTES
10/28/21 0008   NIV Type   NIV Started/Stopped On   Equipment Type V60   Mode Bilevel   Mask Type Full face mask   Mask Size Medium   Settings/Measurements   IPAP 12 cmH20   CPAP/EPAP 6 cmH2O   Rate Ordered 15   Resp 18   FiO2  45 %   I Time/ I Time % 1 s   Vt Exhaled 422 mL   Minute Volume 8.9 Liters   Mask Leak (lpm) 0 lpm   Comfort Level Good   Using Accessory Muscles No   SpO2 99   Alarm Settings   Alarms On Y

## 2021-10-28 NOTE — PROGRESS NOTES
10/27/21 2225   NIV Type   $NIV $Daily Charge   Skin Assessment Clean, dry, & intact   Skin Protection for O2 Device Yes   Location Nose   NIV Started/Stopped On   Equipment Type v60   Mode Bilevel   Mask Type Full face mask   Mask Size Medium   Settings/Measurements   IPAP 12 cmH20   CPAP/EPAP 6 cmH2O   Resp 15   FiO2  100 %   I Time/ I Time % 1 s   Vt Exhaled 510 mL   Minute Volume 13.3 Liters   Mask Leak (lpm) 0 lpm   Comfort Level Good   Using Accessory Muscles Yes   SpO2 98   Breath Sounds   Right Upper Lobe Diminished;Rhonchi   Right Middle Lobe Diminished   Right Lower Lobe Diminished   Left Upper Lobe Diminished;Rhonchi   Left Lower Lobe Diminished   Oxygen Therapy/Pulse Ox   O2 Therapy Oxygen   O2 Device PAP (positive airway pressure)   SpO2 92 %

## 2021-10-29 ENCOUNTER — APPOINTMENT (OUTPATIENT)
Dept: CT IMAGING | Age: 86
DRG: 291 | End: 2021-10-29
Payer: MEDICARE

## 2021-10-29 LAB
ANION GAP SERPL CALCULATED.3IONS-SCNC: 13 MMOL/L (ref 3–16)
BASOPHILS ABSOLUTE: 0 K/UL (ref 0–0.2)
BASOPHILS RELATIVE PERCENT: 0.5 %
BUN BLDV-MCNC: 18 MG/DL (ref 7–20)
CALCIUM SERPL-MCNC: 8.4 MG/DL (ref 8.3–10.6)
CHLORIDE BLD-SCNC: 85 MMOL/L (ref 99–110)
CO2: 30 MMOL/L (ref 21–32)
CREAT SERPL-MCNC: 0.6 MG/DL (ref 0.6–1.2)
EOSINOPHILS ABSOLUTE: 0.2 K/UL (ref 0–0.6)
EOSINOPHILS RELATIVE PERCENT: 2.8 %
GFR AFRICAN AMERICAN: >60
GFR NON-AFRICAN AMERICAN: >60
GLUCOSE BLD-MCNC: 159 MG/DL (ref 70–99)
GLUCOSE BLD-MCNC: 191 MG/DL (ref 70–99)
HCT VFR BLD CALC: 29.6 % (ref 36–48)
HEMOGLOBIN: 9.2 G/DL (ref 12–16)
KEPPRA DOSE AMT: NORMAL
KEPPRA: 22.7 UG/ML (ref 6–46)
LV EF: 50 %
LVEF MODALITY: NORMAL
LYMPHOCYTES ABSOLUTE: 0.5 K/UL (ref 1–5.1)
LYMPHOCYTES RELATIVE PERCENT: 6.8 %
MAGNESIUM: 1.9 MG/DL (ref 1.8–2.4)
MCH RBC QN AUTO: 24 PG (ref 26–34)
MCHC RBC AUTO-ENTMCNC: 31 G/DL (ref 31–36)
MCV RBC AUTO: 77.3 FL (ref 80–100)
MONOCYTES ABSOLUTE: 0.7 K/UL (ref 0–1.3)
MONOCYTES RELATIVE PERCENT: 9.7 %
NEUTROPHILS ABSOLUTE: 6 K/UL (ref 1.7–7.7)
NEUTROPHILS RELATIVE PERCENT: 80.2 %
PDW BLD-RTO: 18.1 % (ref 12.4–15.4)
PERFORMED ON: ABNORMAL
PLATELET # BLD: 346 K/UL (ref 135–450)
PMV BLD AUTO: 7.4 FL (ref 5–10.5)
POTASSIUM REFLEX MAGNESIUM: 2.9 MMOL/L (ref 3.5–5.1)
POTASSIUM SERPL-SCNC: 4.2 MMOL/L (ref 3.5–5.1)
RBC # BLD: 3.83 M/UL (ref 4–5.2)
SODIUM BLD-SCNC: 128 MMOL/L (ref 136–145)
WBC # BLD: 7.5 K/UL (ref 4–11)

## 2021-10-29 PROCEDURE — 84132 ASSAY OF SERUM POTASSIUM: CPT

## 2021-10-29 PROCEDURE — 36415 COLL VENOUS BLD VENIPUNCTURE: CPT

## 2021-10-29 PROCEDURE — 6360000002 HC RX W HCPCS: Performed by: INTERNAL MEDICINE

## 2021-10-29 PROCEDURE — 80177 DRUG SCRN QUAN LEVETIRACETAM: CPT

## 2021-10-29 PROCEDURE — 6370000000 HC RX 637 (ALT 250 FOR IP): Performed by: INTERNAL MEDICINE

## 2021-10-29 PROCEDURE — 70498 CT ANGIOGRAPHY NECK: CPT

## 2021-10-29 PROCEDURE — 6360000004 HC RX CONTRAST MEDICATION: Performed by: INTERNAL MEDICINE

## 2021-10-29 PROCEDURE — 2700000000 HC OXYGEN THERAPY PER DAY

## 2021-10-29 PROCEDURE — 2580000003 HC RX 258: Performed by: INTERNAL MEDICINE

## 2021-10-29 PROCEDURE — 85025 COMPLETE CBC W/AUTO DIFF WBC: CPT

## 2021-10-29 PROCEDURE — 94640 AIRWAY INHALATION TREATMENT: CPT

## 2021-10-29 PROCEDURE — 93306 TTE W/DOPPLER COMPLETE: CPT

## 2021-10-29 PROCEDURE — 2060000000 HC ICU INTERMEDIATE R&B

## 2021-10-29 PROCEDURE — 80048 BASIC METABOLIC PNL TOTAL CA: CPT

## 2021-10-29 PROCEDURE — 83735 ASSAY OF MAGNESIUM: CPT

## 2021-10-29 PROCEDURE — 70450 CT HEAD/BRAIN W/O DYE: CPT

## 2021-10-29 PROCEDURE — 94761 N-INVAS EAR/PLS OXIMETRY MLT: CPT

## 2021-10-29 PROCEDURE — 99223 1ST HOSP IP/OBS HIGH 75: CPT | Performed by: NURSE PRACTITIONER

## 2021-10-29 RX ORDER — POTASSIUM CHLORIDE 7.45 MG/ML
10 INJECTION INTRAVENOUS
Status: COMPLETED | OUTPATIENT
Start: 2021-10-29 | End: 2021-10-29

## 2021-10-29 RX ADMIN — ENOXAPARIN SODIUM 30 MG: 30 INJECTION SUBCUTANEOUS at 12:18

## 2021-10-29 RX ADMIN — IOPAMIDOL 75 ML: 755 INJECTION, SOLUTION INTRAVENOUS at 09:45

## 2021-10-29 RX ADMIN — FUROSEMIDE 5 MG/HR: 10 INJECTION INTRAMUSCULAR; INTRAVENOUS at 01:07

## 2021-10-29 RX ADMIN — POTASSIUM CHLORIDE 10 MEQ: 7.46 INJECTION, SOLUTION INTRAVENOUS at 12:29

## 2021-10-29 RX ADMIN — POLYETHYLENE GLYCOL 3350 17 G: 17 POWDER, FOR SOLUTION ORAL at 12:20

## 2021-10-29 RX ADMIN — LEVETIRACETAM 500 MG: 100 INJECTION, SOLUTION INTRAVENOUS at 11:57

## 2021-10-29 RX ADMIN — POTASSIUM BICARBONATE 40 MEQ: 782 TABLET, EFFERVESCENT ORAL at 12:18

## 2021-10-29 RX ADMIN — POTASSIUM CHLORIDE 10 MEQ: 7.46 INJECTION, SOLUTION INTRAVENOUS at 14:03

## 2021-10-29 RX ADMIN — LEVETIRACETAM 500 MG: 100 INJECTION, SOLUTION INTRAVENOUS at 20:47

## 2021-10-29 RX ADMIN — GUAIFENESIN 600 MG: 600 TABLET, EXTENDED RELEASE ORAL at 20:42

## 2021-10-29 RX ADMIN — LEVOTHYROXINE SODIUM 50 MCG: 0.05 TABLET ORAL at 20:42

## 2021-10-29 RX ADMIN — TAMSULOSIN HYDROCHLORIDE 0.4 MG: 0.4 CAPSULE ORAL at 12:17

## 2021-10-29 RX ADMIN — Medication 2 PUFF: at 08:44

## 2021-10-29 RX ADMIN — MUPIROCIN: 20 OINTMENT TOPICAL at 20:49

## 2021-10-29 RX ADMIN — GUAIFENESIN 600 MG: 600 TABLET, EXTENDED RELEASE ORAL at 12:17

## 2021-10-29 RX ADMIN — FERROUS SULFATE TAB 325 MG (65 MG ELEMENTAL FE) 325 MG: 325 (65 FE) TAB at 12:17

## 2021-10-29 RX ADMIN — TIOTROPIUM BROMIDE INHALATION SPRAY 2 PUFF: 3.12 SPRAY, METERED RESPIRATORY (INHALATION) at 08:44

## 2021-10-29 RX ADMIN — Medication 2 PUFF: at 20:11

## 2021-10-29 RX ADMIN — MONTELUKAST SODIUM 10 MG: 10 TABLET ORAL at 20:42

## 2021-10-29 ASSESSMENT — PAIN SCALES - GENERAL: PAINLEVEL_OUTOF10: 0

## 2021-10-29 NOTE — PROGRESS NOTES
Hospitalist Progress Note      PCP: Anita Hoyt MD    Date of Admission: 10/26/2021    Chief Complaint: SOB    Hospital Course:   80 y.o. female who presented to [facility] with past medical hypertension, hyperlipidemia, osteoporosis, seizure disorder, hypothyroidism, CAD, presents to the ED due to worsening acute respiratory failure. Patient came from outside facility encephalopathic thus history is limited. Per chart review patient has been having worsening hypoxia satting in 60% when EMS arrived. Patient was eval patient had consistent hypoxia however was responsive to oxygen. On patient arrival patient was placed on BiPAP. Subjective: Patient with new left sided weakness and mental status change. Code stroke initiated.  stroke team notified.       Medications:  Reviewed    Infusion Medications    sodium chloride      furosemide (LASIX) 1mg/ml infusion 5 mg/hr (10/29/21 0107)    sodium chloride       Scheduled Medications    sodium chloride flush  10 mL IntraVENous 2 times per day    levetiracetam  500 mg IntraVENous Q12H    enoxaparin  30 mg SubCUTAneous Daily    guaiFENesin  600 mg Oral BID    budesonide-formoterol  2 puff Inhalation BID    levothyroxine  50 mcg Oral Nightly    montelukast  10 mg Oral Nightly    tamsulosin  0.4 mg Oral Daily    tiotropium  2 puff Inhalation Daily    ferrous sulfate  325 mg Oral Daily with breakfast    polyethylene glycol  17 g Oral Daily    sodium chloride flush  5-40 mL IntraVENous 2 times per day     PRN Meds: sodium chloride flush, sodium chloride, potassium chloride, magnesium sulfate, promethazine **OR** ondansetron, acetaminophen **OR** acetaminophen, albuterol sulfate HFA, sodium chloride, sodium chloride flush, sodium chloride      Intake/Output Summary (Last 24 hours) at 10/29/2021 0957  Last data filed at 10/29/2021 0526  Gross per 24 hour   Intake 780 ml   Output 3550 ml   Net -2770 ml       Physical Exam Performed:    BP (!) CT CHEST PULMONARY EMBOLISM W CONTRAST   Final Result   1. No scan evidence for pulmonary embolus. 2. Bilateral pleural effusions with complete collapse of both lower lobes as   well as the middle lobe. XR CHEST PORTABLE   Final Result   Probable perihilar edema and CHF. Bilateral pleural effusions, right greater   than left with associated bibasilar volume loss. Underlying infiltrate not   excluded. CT HEAD WO CONTRAST    (Results Pending)   CTA HEAD NECK W CONTRAST    (Results Pending)           Assessment/Plan:    Active Hospital Problems    Diagnosis     Acute respiratory failure (HCC) [J96.00]      Acute heart failure, suspect diastolic  - echo ordered  - lasix gtt stopped  - will consider cardiology consult pending echo results    Acute hypoxic respiratory failure  - 2/2 above  - wean O2 as able  - PCT WNL. No need for abx  - COVID 19 PCR negative    Left sided weakness  - code stroke initiated.  stroke team notified  - CT head negative  - possible tPA candidate    Acute metabolic encephalopathy  - 2/2 above  - CT head negative  - supportive care    Afib with RVR  - HR improved with digoxin, diuresis  - no AC prior to admission  - echo ordered    Asthma  - no evidence of exacerbation  - continue home inhalers    Fe deficient anemia  - Fe supplement ordered  - continue to monitor    Severe protein calorie malnutrition  - dietitian consult  - supplements ordered    Seizures  - continue home AED    Hypothyroidism  - continue home synthroid    DVT Prophylaxis: lovenox  Diet: ADULT ORAL NUTRITION SUPPLEMENT; Lunch, Dinner, Breakfast; Standard 4 oz Oral Supplement  ADULT DIET; Regular; No Added Salt (3-4 gm); 2000 ml  Code Status: DNR-CCA    PT/OT Eval Status: not ordered    Dispo - unclear. POA notified via voicemail about acute change. Will need further [address] discussions.     Jim Seip, MD

## 2021-10-29 NOTE — CARE COORDINATION
Patient was sent to ICU for code stroke earlier today. Mental status changes. Stroke was ruled out and left sided weakness possibly due to focal seizure. On Keppra. Orders to check Keppra levels. Seizure precautions. POA notified about status change per MD, may need goals of care discussions. LTC at [facility]. Patient can return when medically stable.

## 2021-10-29 NOTE — CONSULTS
In patient Neurology consult        [facility] Neurology      MD Sayda Richter  3/7/1929    Date of Service: 10/29/2021    Referring Physician: Macie Suazo MD      Reason for the consult and CC: Acute left-sided weakness    HPI:   The patient is a 80 y.o.  female, with a PMH of seizures, HTN, HLD, CAD, and COPD, who presented to [facility] with worsening acute respiratory failure. She was admitted on 10/26 and was treated for suspected congestive heart failure. A rapid response was called on 10/29 for acute left-sided weakness. The patient tells me she does not remember the event at all and tells me she had a \"seizure\". The nursing staff said the patient had a left-sided gaze, left arm weakness, and was unresponsive. UC stroke team was called and a stat head CT was obtained. Her CT head was negative. Stroke team did not recommend TPA. The whole episode lasted approximately 20 minutes. The nursing staff was able to speak to the patient's family, who states the patient has a history of focal seizures, and was extensively worked up at [facility]. At the time of my exam the patient is back to baseline. She is awake and alert and following commands. History reviewed. No pertinent family history.   Past Surgical History:   Procedure Laterality Date    ANKLE SURGERY Left 10/27/2016    OPEN REDUCTION INTERNAL FIXATION BI MALLEOLAR FRACTURE LEFT ANKLE, REPAIR OF LACERATION LEFT LOWER LEG    FRACTURE SURGERY  10/27/2016    ORIF left ankle        Past Medical History:   Diagnosis Date    Anemia     C. difficile diarrhea     CAD (coronary artery disease)     CHF (congestive heart failure) (HCC)     COPD (chronic obstructive pulmonary disease) (HCC)     Depression     Hyperlipidemia     Hypertension     Hyponatremia     Osteoporosis     Seizures (HCC)     Thyroid disease     hypo     Social History     Tobacco Use    Smoking status: Never Smoker    Smokeless tobacco: Never Used   Substance Use Topics    Alcohol use: No    Drug use: No     Allergies   Allergen Reactions    Atorvastatin     Morphine     Mushroom Extract Complex     Pcn [Penicillins]     Polysorbate     Sulfa Antibiotics     Theophylline Er Hives    Theophyllines     Zocor [Simvastatin]      Current Facility-Administered Medications   Medication Dose Route Frequency Provider Last Rate Last Admin    mupirocin (BACTROBAN) 2 % ointment   Nasal BID Asia Norton MD        sodium chloride flush 0.9 % injection 10 mL  10 mL IntraVENous 2 times per day Helen Cane A Abdirashid, DO   10 mL at 10/28/21 0915    sodium chloride flush 0.9 % injection 10 mL  10 mL IntraVENous PRN Ahmad A Abdirashid, DO        0.9 % sodium chloride infusion  25 mL IntraVENous PRN Ahmad A Abdirashid, DO        magnesium sulfate 2000 mg in 50 mL IVPB premix  2,000 mg IntraVENous PRN Helen Cane A Abdirashid, DO        promethazine (PHENERGAN) tablet 12.5 mg  12.5 mg Oral Q6H PRN Ahmad A Abdirashid, DO        Or    ondansetron (ZOFRAN) injection 4 mg  4 mg IntraVENous Q6H PRN Ahmad A Abdirashid, DO        acetaminophen (TYLENOL) tablet 650 mg  650 mg Oral Q6H PRN Ahmad A Abdirashid, DO        Or    acetaminophen (TYLENOL) suppository 650 mg  650 mg Rectal Q6H PRN Ahmad A Abdirashid, DO        levETIRAcetam (KEPPRA) 500 mg in sodium chloride 0.9 % 100 mL IVPB  500 mg IntraVENous Q12H Ahmad A Abdirashid, DO   Stopped at 10/29/21 1229    enoxaparin (LOVENOX) injection 30 mg  30 mg SubCUTAneous Daily Ahmad A Abdirashid, DO   30 mg at 10/29/21 1218    albuterol sulfate  (90 Base) MCG/ACT inhaler 2 puff  2 puff Inhalation Q6H PRN Ahmad A Abdirashid, DO   2 puff at 10/27/21 0545    guaiFENesin (MUCINEX) extended release tablet 600 mg  600 mg Oral BID Ahmad A Abdirashid, DO   600 mg at 10/29/21 1217    budesonide-formoterol (SYMBICORT) 160-4.5 MCG/ACT inhaler 2 puff  2 puff Inhalation BID Ahmad A Abdirashid, DO   2 puff at 10/29/21 0844    levothyroxine (SYNTHROID) tablet 50 mcg  50 mcg Oral Nightly Marcos Mendenhall DO   50 mcg at 10/28/21 2045    montelukast (SINGULAIR) tablet 10 mg  10 mg Oral Nightly Ahmad A Abdirashid, DO   10 mg at 10/28/21 2044    tamsulosin (FLOMAX) capsule 0.4 mg  0.4 mg Oral Daily Ahmad A Abdirashid, DO   0.4 mg at 10/29/21 1217    tiotropium (SPIRIVA RESPIMAT) 2.5 MCG/ACT inhaler 2 puff  2 puff Inhalation Daily Ahmad A Abdirashid, DO   2 puff at 10/29/21 0844    ferrous sulfate (IRON 325) tablet 325 mg  325 mg Oral Daily with breakfast Joesph Lucas MD   325 mg at 10/29/21 1217    sodium chloride (OCEAN, BABY AYR) 0.65 % nasal spray 1 spray  1 spray Each Nostril PRN Joesph Lucas MD   1 spray at 10/27/21 1801    polyethylene glycol (GLYCOLAX) packet 17 g  17 g Oral Daily Joesph Lucas MD   17 g at 10/29/21 1220    sodium chloride flush 0.9 % injection 5-40 mL  5-40 mL IntraVENous 2 times per day Usha Worley MD        sodium chloride flush 0.9 % injection 5-40 mL  5-40 mL IntraVENous PRN Usha Worley MD        0.9 % sodium chloride infusion  25 mL IntraVENous PRN Usha Worley MD           ROS : A 10-14 system review of constitutional, cardiovascular, respiratory, eyes, musculoskeletal, endocrine, GI, ENT, skin, hematological, genitourinary, psychiatric and neurologic systems was obtained and updated today and is unremarkable except as mentioned in my HPI      Exam:     Constitutional:   Vitals:    10/29/21 1130 10/29/21 1137 10/29/21 1146 10/29/21 1530   BP: 99/63  125/73 128/69   Pulse: 103  94 96   Resp: 14  14 14   Temp: 97.8 °F (36.6 °C)  97.8 °F (36.6 °C) 98.1 °F (36.7 °C)   TempSrc: Oral Oral Oral Oral   SpO2:   94%    Weight:       Height:           General appearance and observation: Normal development and appear in no acute distress. Neck: supple  Cardiovascular: No lower leg edema with good pulsation. Mental Status:   Oriented to person, place, problem, and time. Memory: Good immediate recall. Intact remote memory  Normal attention span and concentration.   Language: intact naming, repeating and Keppra. Check Keppra level. Seizure precautions  Continue home BP meds. Follow electrolytes. Respiratory support. Low dose SSI. Thank you for referring such patient. If you have any questions regarding my consult note, please don't hesitate to call me. Charles Almanza, CNP  [phone]    Attending Supervising [de-identified] Chauncey Allison Statement   I reviewed and agree with the findings and plan as documented in NP consult note   The patient was admitted for focal left-sided weakness. Known history of focal seizure. Continue Keppra the same dose  Symptoms improved  Agree with above note  MRI brain  PT and OT  Blood pressure control  DC planning when back to baseline. Electronically signed by Cony Edwards MD on 10/31/21 at 12:00 PM EDT      This dictation was generated by voice recognition computer software.  Although all attempts are made to edit the dictation for accuracy, there may be errors in the  transcription that are not intended

## 2021-10-29 NOTE — PROGRESS NOTES
Spoke with MD Escoto and stated no intervention was needed. No TPA is needed. NIH 0. Symptoms resolved.

## 2021-10-29 NOTE — PROGRESS NOTES
Pt arrived from CT scan oriented and following all commands. 0 on NIH. MD Mary Wei stated if  Stroke team calls back and advises no TPA she can be moved back to C4 stanfing order.

## 2021-10-29 NOTE — CODE DOCUMENTATION
Pt appears non responsive, Sleepy, eyes closed. Pt being taken to CT with charge nurse Stefano Perez and Primary RN Radha Robins.  Flaccid on L side, facial drooping present

## 2021-10-30 LAB
ANION GAP SERPL CALCULATED.3IONS-SCNC: 11 MMOL/L (ref 3–16)
BLOOD CULTURE, ROUTINE: NORMAL
BUN BLDV-MCNC: 19 MG/DL (ref 7–20)
CALCIUM SERPL-MCNC: 8.9 MG/DL (ref 8.3–10.6)
CHLORIDE BLD-SCNC: 91 MMOL/L (ref 99–110)
CO2: 30 MMOL/L (ref 21–32)
CREAT SERPL-MCNC: <0.5 MG/DL (ref 0.6–1.2)
GFR AFRICAN AMERICAN: >60
GFR NON-AFRICAN AMERICAN: >60
GLUCOSE BLD-MCNC: 94 MG/DL (ref 70–99)
POTASSIUM REFLEX MAGNESIUM: 3.9 MMOL/L (ref 3.5–5.1)
PRO-BNP: 1314 PG/ML (ref 0–449)
SODIUM BLD-SCNC: 132 MMOL/L (ref 136–145)

## 2021-10-30 PROCEDURE — 94640 AIRWAY INHALATION TREATMENT: CPT

## 2021-10-30 PROCEDURE — 6370000000 HC RX 637 (ALT 250 FOR IP): Performed by: NURSE PRACTITIONER

## 2021-10-30 PROCEDURE — 94761 N-INVAS EAR/PLS OXIMETRY MLT: CPT

## 2021-10-30 PROCEDURE — 6370000000 HC RX 637 (ALT 250 FOR IP): Performed by: INTERNAL MEDICINE

## 2021-10-30 PROCEDURE — 36415 COLL VENOUS BLD VENIPUNCTURE: CPT

## 2021-10-30 PROCEDURE — 99233 SBSQ HOSP IP/OBS HIGH 50: CPT | Performed by: NURSE PRACTITIONER

## 2021-10-30 PROCEDURE — 80048 BASIC METABOLIC PNL TOTAL CA: CPT

## 2021-10-30 PROCEDURE — 83880 ASSAY OF NATRIURETIC PEPTIDE: CPT

## 2021-10-30 PROCEDURE — 2700000000 HC OXYGEN THERAPY PER DAY

## 2021-10-30 PROCEDURE — 6360000002 HC RX W HCPCS: Performed by: INTERNAL MEDICINE

## 2021-10-30 PROCEDURE — 2580000003 HC RX 258: Performed by: INTERNAL MEDICINE

## 2021-10-30 PROCEDURE — 2580000003 HC RX 258: Performed by: EMERGENCY MEDICINE

## 2021-10-30 PROCEDURE — 1200000000 HC SEMI PRIVATE

## 2021-10-30 RX ORDER — LEVETIRACETAM 250 MG/1
250 TABLET ORAL DAILY
Status: DISCONTINUED | OUTPATIENT
Start: 2021-10-30 | End: 2021-10-31 | Stop reason: HOSPADM

## 2021-10-30 RX ORDER — CARVEDILOL 3.12 MG/1
3.12 TABLET ORAL 2 TIMES DAILY WITH MEALS
Status: DISCONTINUED | OUTPATIENT
Start: 2021-10-30 | End: 2021-10-31 | Stop reason: HOSPADM

## 2021-10-30 RX ORDER — ASPIRIN 81 MG/1
81 TABLET, CHEWABLE ORAL DAILY
Status: DISCONTINUED | OUTPATIENT
Start: 2021-10-30 | End: 2021-10-31 | Stop reason: HOSPADM

## 2021-10-30 RX ADMIN — TAMSULOSIN HYDROCHLORIDE 0.4 MG: 0.4 CAPSULE ORAL at 09:46

## 2021-10-30 RX ADMIN — ENOXAPARIN SODIUM 30 MG: 30 INJECTION SUBCUTANEOUS at 09:46

## 2021-10-30 RX ADMIN — SODIUM CHLORIDE, PRESERVATIVE FREE 10 ML: 5 INJECTION INTRAVENOUS at 09:47

## 2021-10-30 RX ADMIN — MUPIROCIN: 20 OINTMENT TOPICAL at 09:46

## 2021-10-30 RX ADMIN — LEVETIRACETAM 750 MG: 500 TABLET ORAL at 20:06

## 2021-10-30 RX ADMIN — FERROUS SULFATE TAB 325 MG (65 MG ELEMENTAL FE) 325 MG: 325 (65 FE) TAB at 09:46

## 2021-10-30 RX ADMIN — GUAIFENESIN 600 MG: 600 TABLET, EXTENDED RELEASE ORAL at 20:06

## 2021-10-30 RX ADMIN — Medication 2 PUFF: at 07:46

## 2021-10-30 RX ADMIN — ASPIRIN 81 MG: 81 TABLET, CHEWABLE ORAL at 14:12

## 2021-10-30 RX ADMIN — GUAIFENESIN 600 MG: 600 TABLET, EXTENDED RELEASE ORAL at 09:46

## 2021-10-30 RX ADMIN — CARVEDILOL 3.12 MG: 3.12 TABLET, FILM COATED ORAL at 17:07

## 2021-10-30 RX ADMIN — MUPIROCIN: 20 OINTMENT TOPICAL at 20:08

## 2021-10-30 RX ADMIN — TIOTROPIUM BROMIDE INHALATION SPRAY 2 PUFF: 3.12 SPRAY, METERED RESPIRATORY (INHALATION) at 07:46

## 2021-10-30 RX ADMIN — POLYETHYLENE GLYCOL 3350 17 G: 17 POWDER, FOR SOLUTION ORAL at 09:47

## 2021-10-30 RX ADMIN — SODIUM CHLORIDE, PRESERVATIVE FREE 10 ML: 5 INJECTION INTRAVENOUS at 20:22

## 2021-10-30 RX ADMIN — LEVOTHYROXINE SODIUM 50 MCG: 0.05 TABLET ORAL at 20:06

## 2021-10-30 RX ADMIN — CARVEDILOL 3.12 MG: 3.12 TABLET, FILM COATED ORAL at 10:53

## 2021-10-30 RX ADMIN — LEVETIRACETAM 500 MG: 100 INJECTION, SOLUTION INTRAVENOUS at 09:47

## 2021-10-30 RX ADMIN — MONTELUKAST SODIUM 10 MG: 10 TABLET ORAL at 20:07

## 2021-10-30 RX ADMIN — LEVETIRACETAM 250 MG: 250 TABLET ORAL at 14:12

## 2021-10-30 NOTE — PROGRESS NOTES
Hospitalist Progress Note      PCP: Vannesa Goff MD    Date of Admission: 10/26/2021    Chief Complaint: SOB    Hospital Course:   80 y.o. female who presented to [facility] with past medical hypertension, hyperlipidemia, osteoporosis, seizure disorder, hypothyroidism, CAD, presents to the ED due to worsening acute respiratory failure. Patient came from outside facility encephalopathic thus history is limited. Per chart review patient has been having worsening hypoxia satting in 60% when EMS arrived. Patient was eval patient had consistent hypoxia however was responsive to oxygen. On patient arrival patient was placed on BiPAP. Subjective: Patient much improved following likely seizure yesterday. No new complaints.       Medications:  Reviewed    Infusion Medications    sodium chloride      sodium chloride       Scheduled Medications    carvedilol  3.125 mg Oral BID WC    aspirin  81 mg Oral Daily    mupirocin   Nasal BID    sodium chloride flush  10 mL IntraVENous 2 times per day    levetiracetam  500 mg IntraVENous Q12H    enoxaparin  30 mg SubCUTAneous Daily    guaiFENesin  600 mg Oral BID    budesonide-formoterol  2 puff Inhalation BID    levothyroxine  50 mcg Oral Nightly    montelukast  10 mg Oral Nightly    tamsulosin  0.4 mg Oral Daily    tiotropium  2 puff Inhalation Daily    ferrous sulfate  325 mg Oral Daily with breakfast    polyethylene glycol  17 g Oral Daily    sodium chloride flush  5-40 mL IntraVENous 2 times per day     PRN Meds: sodium chloride flush, sodium chloride, magnesium sulfate, promethazine **OR** ondansetron, acetaminophen **OR** acetaminophen, albuterol sulfate HFA, sodium chloride, sodium chloride flush, sodium chloride      Intake/Output Summary (Last 24 hours) at 10/30/2021 1335  Last data filed at 10/30/2021 0227  Gross per 24 hour   Intake 800 ml   Output 1300 ml   Net -500 ml       Physical Exam Performed:    /62   Pulse 81   Temp 97.9 °F (36.6 °C) (Oral)   Resp 16   Ht 5' 6\" (1.676 m)   Wt 98 lb 8.7 oz (44.7 kg)   SpO2 94%   BMI 15.91 kg/m²     General appearance: No apparent distress, appears stated age and cooperative. HEENT: Pupils equal, round, and reactive to light. Conjunctivae/corneas clear. Neck: Supple, with full range of motion. No jugular venous distention. Trachea midline. Respiratory:  Normal respiratory effort. Rales bilaterally without Wheezes/Rhonchi. Cardiovascular: regular rate, irregular rhythm with normal S1/S2 without murmurs, rubs or gallops. Abdomen: Soft, non-tender, non-distended with normal bowel sounds. Musculoskeletal: No clubbing, cyanosis or edema bilaterally. Full range of motion without deformity. Skin: Skin color, texture, turgor normal.  No rashes or lesions. Neurologic:  Left sided weakness. Facial droop. Psychiatric: Alert but disoriented  Capillary Refill: Brisk,3 seconds, normal   Peripheral Pulses: +2 palpable, equal bilaterally       Labs:   Recent Labs     10/28/21  0425 10/29/21  1009   WBC 7.5 7.5   HGB 7.9* 9.2*   HCT 25.1* 29.6*    346     Recent Labs     10/28/21  0425 10/28/21  0425 10/29/21  1008 10/29/21  1714 10/30/21  0437     --  128*  --  132*   K 2.9*   < > 2.9* 4.2 3.9   CL 96*  --  85*  --  91*   CO2 28  --  30  --  30   BUN 18  --  18  --  19   CREATININE 0.6  --  0.6  --  <0.5*   CALCIUM 8.1*  --  8.4  --  8.9    < > = values in this interval not displayed. Recent Labs     10/28/21  0425   AST 21   ALT 11   BILITOT 0.3   ALKPHOS 125     No results for input(s): INR in the last 72 hours. No results for input(s): Carol Lowell in the last 72 hours.     Urinalysis:      Lab Results   Component Value Date    NITRU Negative 10/27/2016    WBCUA 10-20 10/27/2016    BACTERIA 4+ 10/27/2016    RBCUA 0-2 10/27/2016    BLOODU SMALL 10/27/2016    SPECGRAV 1.020 10/27/2016    GLUCOSEU Negative 10/27/2016       Radiology:  CTA HEAD NECK W CONTRAST   Final Result 1. 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 However, evaluation in this   region is somewhat limited given streak artifact from ORIF hardware of the   left clavicle. 2. Moderate stenosis is seen involving the V3 segment of the right vertebral   artery. 3. Atherosclerosis contributes to less than 50% stenosis of the proximal   right cervical ICA by NASCET criteria. 4. There is moderate diffuse irregularity involving the left PCA branches. 5. Atherosclerosis contributes to mild stenosis of the proximal V4 segments   bilaterally. 6. Mild-to-moderate stenosis of the supraclinoid internal carotid arteries   bilaterally due to atherosclerosis. 7. Bilateral pleural effusions are seen with associated atelectasis. 8. Scarring in the lung apices bilaterally with pleural based calcifications. CT HEAD WO CONTRAST   Final Result   No hemorrhage or mass identified      Atrophy and small-vessel ischemic change, similar to prior      Results discussed with Jame Sunshine by Moises Schwartz. Kenan Morton MD at 9:56 am on   10/29/2021         CT HEAD WO CONTRAST   Final Result   No acute intracranial abnormality. CT CHEST PULMONARY EMBOLISM W CONTRAST   Final Result   1. No scan evidence for pulmonary embolus. 2. Bilateral pleural effusions with complete collapse of both lower lobes as   well as the middle lobe. XR CHEST PORTABLE   Final Result   Probable perihilar edema and CHF. Bilateral pleural effusions, right greater   than left with associated bibasilar volume loss. Underlying infiltrate not   excluded. Assessment/Plan:    Active Hospital Problems    Diagnosis     Acute respiratory failure (HCC) [J96.00]      Acute diastolic heart failure  - echo with EF 50%  - lasix gtt stopped.  Will monitor off diuretic for now  - started on coreg    Acute hypoxic respiratory failure  - 2/2 above  - wean O2 as able  - PCT WNL. No need for abx  - COVID 19 PCR negative    Seizure  - likely with seizure episode on 10/29  - neurology consulted  - continue keppra  - MRI brain cancelled as patient declined further evaluation  - seizure precautions    Acute metabolic encephalopathy  - 2/2 above  - CT head negative  - supportive care    Afib with RVR  - HR improved with digoxin, diuresis  - started on ASA. POA declined full AC due to bleeding risk  - echo ordered    Asthma  - no evidence of exacerbation  - continue home inhalers    Fe deficient anemia  - Fe supplement ordered  - continue to monitor    Severe protein calorie malnutrition  - dietitian consult  - supplements ordered    Hypothyroidism  - continue home synthroid    DVT Prophylaxis: lovenox  Diet: ADULT ORAL NUTRITION SUPPLEMENT; Lunch, Dinner, Breakfast; Standard 4 oz Oral Supplement  ADULT DIET; Regular;  No Added Salt (3-4 gm); 2000 ml  Code Status: DNR-CCA    PT/OT Eval Status: not ordered    Dispo - likely back to [facility] tomorrow following GOC discussion with Titus Lombard, MD

## 2021-10-30 NOTE — PROGRESS NOTES
650 mg Rectal Q6H PRN Enmanuel Mendenhall, DO        levETIRAcetam (KEPPRA) 500 mg in sodium chloride 0.9 % 100 mL IVPB  500 mg IntraVENous Q12H Marcos Mendenhall DO   Stopped at 10/30/21 1033    enoxaparin (LOVENOX) injection 30 mg  30 mg SubCUTAneous Daily Ahmad SHIVAM Mendenhall, DO   30 mg at 10/30/21 0946    albuterol sulfate  (90 Base) MCG/ACT inhaler 2 puff  2 puff Inhalation Q6H PRN Marcos Mendenhall DO   2 puff at 10/27/21 0545    guaiFENesin (MUCINEX) extended release tablet 600 mg  600 mg Oral BID Ahmad SHIVAM Mendenhall, DO   600 mg at 10/30/21 0946    budesonide-formoterol (SYMBICORT) 160-4.5 MCG/ACT inhaler 2 puff  2 puff Inhalation BID Ivy Mendenhall, DO   2 puff at 10/30/21 0746    levothyroxine (SYNTHROID) tablet 50 mcg  50 mcg Oral Nightly Ahmad SHIVAM Mendenhall, DO   50 mcg at 10/29/21 2042    montelukast (SINGULAIR) tablet 10 mg  10 mg Oral Nightly Ahmad SHIVAM Mendenhall, DO   10 mg at 10/29/21 2042    tamsulosin (FLOMAX) capsule 0.4 mg  0.4 mg Oral Daily Ahmad SHIVAM Mendenhall, DO   0.4 mg at 10/30/21 0946    tiotropium (SPIRIVA RESPIMAT) 2.5 MCG/ACT inhaler 2 puff  2 puff Inhalation Daily Marcos Mendenhall DO   2 puff at 10/30/21 0746    ferrous sulfate (IRON 325) tablet 325 mg  325 mg Oral Daily with breakfast Zev Salinas MD   325 mg at 10/30/21 0946    sodium chloride (OCEAN, BABY AYR) 0.65 % nasal spray 1 spray  1 spray Each Nostril PRN Zev Salinas MD   1 spray at 10/27/21 1801    polyethylene glycol (GLYCOLAX) packet 17 g  17 g Oral Daily Zev Salinas MD   17 g at 10/30/21 0947    sodium chloride flush 0.9 % injection 5-40 mL  5-40 mL IntraVENous 2 times per day Darin Dinh MD        sodium chloride flush 0.9 % injection 5-40 mL  5-40 mL IntraVENous PRN Darin Dinh MD        0.9 % sodium chloride infusion  25 mL IntraVENous PRN Darin Dinh MD         Allergies   Allergen Reactions    Atorvastatin     Morphine     Mushroom Extract Complex     Pcn [Penicillins]     Polysorbate     Sulfa  10/29/2021     Lab Results   Component Value Date    INR 1.00 10/27/2016    PROTIME 11.4 10/27/2016       Neuroimaging was independently reviewed by me and discussed results with the patient  I reviewed blood testing and other test results and discussed results with the patient      Impression:     Acute left-sided weakness, transient - likely due to breakthrough focal seizure. Family states this episode is consistent with history. Hypertension  Hyperlipidemia  Acute hypoxic respiratory failure  Acute diastolic CHF  Hypokalemia       Recommendation    Patient is refusing MRI of brain. Continue home Keppra - has not been receiving her home dosing since admission. Likely cause of her breakthrough seizure. She takes 250 mg every morning and 750 mg nightly. Keppra level 22.7 after loaded with IV. Seizure precautions  Continue home BP meds. Follow electrolytes. Respiratory support. SSI coverage while inpatient     She may be discharged from a neurological perspective when medically stable. F/u with primary neurologist.      Avila Drake, LAURIE      This dictation was generated by voice recognition computer software. Although all attempts are made to edit the dictation for accuracy, there may be errors in the transcription that are not intended.

## 2021-10-30 NOTE — FLOWSHEET NOTE
10/30/21 0930   Assessment   Charting Type Shift assessment   Neurological   Neuro (WDL) WDL   Level of Consciousness Alert (0)   Orientation Level Oriented X4   Cognition Follows commands   Language Clear   Dayna Coma Scale   Eye Opening 4   Best Verbal Response 5   Best Motor Response 6   Edwards Coma Scale Score 15   HEENT   HEENT (WDL) X   Right Eye Impaired vision   Left Eye Impaired vision   Right Ear Impaired hearing   Left Ear Impaired hearing   Nose Intact   Throat Intact   Neck Trachea midline;Symmetrical   Tongue Pink & moist   Voice Normal   Mucous Membrane Moist;Pink; Intact   Teeth Missing teeth   Respiratory   Respiratory (WDL) X   Respiratory Pattern Regular   Respiratory Depth Shallow   Respiratory Quality/Effort Unlabored   Chest Assessment Trachea midline; Chest expansion symmetrical   L Breath Sounds Rhonchi;Diminished   R Breath Sounds Rhonchi;Diminished   Breath Sounds   Right Upper Lobe Diminished;Rhonchi   Right Middle Lobe Diminished   Right Lower Lobe Diminished   Left Upper Lobe Diminished;Rhonchi   Left Lower Lobe Diminished   Cardiac   Cardiac (WDL) X   Cardiac Regularity Irregular   Heart Sounds S1, S2   Cardiac Rhythm Atrial fib; Sinus rhythm   Rhythm Interpretation   Pulse 91   Cardiac Monitor   Telemetry Monitor On Yes   Telemetry Audible Yes   Telemetry Alarms Set Yes   Telemetry Box Number 74   Gastrointestinal   Abdominal (WDL) WDL   Peripheral Vascular   Peripheral Vascular (WDL) WDL   Edema None   RLE Edema None   LLE Edema None   Skin Color/Condition   Skin Color/Condition (WDL) X   Skin Color Appropriate for ethnicity   Skin Condition/Temp Warm;Dry   Skin Integrity   Skin Integrity (WDL) WDL   Skin Integrity Bruising   Location   (scattered)   Musculoskeletal   Musculoskeletal (WDL) X  (generalized weakness)   Genitourinary   Genitourinary (WDL) X  (woodson)   Flank Tenderness No   Suprapubic Tenderness No   Dysuria No   Anus/Rectum   Anus/Rectum (WDL) WDL   Psychosocial Psychosocial (WDL) WDL

## 2021-10-30 NOTE — ACP (ADVANCE CARE PLANNING)
ADVANCED CARE PLANNING    Mau Cooley       :  3/7/1929              MRN:  [id number]  Admission Date: 10/26/2021  7:10 PM  Date of Discussion:  10/30/21      Purpose of Encounter: Advanced care planning in light of CHF. Parties in attendance: :Андрей Ledesma, Amy Wagner MD, Family members: Stacey SOTO [facility]). Decisional Capacity:No      Objective/Medical Story: Patient admitted with new onset CHF. She has been diuresed well. Patient has known seizures and had one episode during admission. She has been worked up extensively for this previously. Active Diagnoses: Active Hospital Problems    Diagnosis Date Noted    Acute respiratory failure ([facility] [address] [J96.00] 10/26/2021       These active diagnoses are of sufficient risk that focused discussion on advance care planning is indicated in order to allow the patient to thoughtfully consider personal goals of care; and if situations arise that prevent the ability to personally give input, to ensure appropriate representation of their personal desires for different levels and agressiveness of care. Goals of Care Determinations: Patient wishes to focus on aggressive care. Code Status: At this time patient wishes to be DNR         Time Spent on Advanced Planning Documents: 16 mins. The following items were considered in medical decision making:  Independent review of images , Review / order clinical lab tests. Review / order radiology tests, Decision to obtain old records. Advanced Care Planning Documents: Completed advances directives, advanced directives in chart. Electronically signed by Amy Wagner MD on 10/30/2021 at 2:38 PM    Thank you Juliette Davis MD for the opportunity to be involved in this patient's care. If you have any questions or concerns please feel free to contact me at [phone].

## 2021-10-31 VITALS
OXYGEN SATURATION: 95 % | HEIGHT: 66 IN | WEIGHT: 98.55 LBS | HEART RATE: 98 BPM | RESPIRATION RATE: 18 BRPM | BODY MASS INDEX: 15.84 KG/M2 | TEMPERATURE: 97.9 F | SYSTOLIC BLOOD PRESSURE: 107 MMHG | DIASTOLIC BLOOD PRESSURE: 57 MMHG

## 2021-10-31 LAB — SARS-COV-2, NAAT: NOT DETECTED

## 2021-10-31 PROCEDURE — 2580000003 HC RX 258: Performed by: INTERNAL MEDICINE

## 2021-10-31 PROCEDURE — 94761 N-INVAS EAR/PLS OXIMETRY MLT: CPT

## 2021-10-31 PROCEDURE — 2700000000 HC OXYGEN THERAPY PER DAY

## 2021-10-31 PROCEDURE — 6370000000 HC RX 637 (ALT 250 FOR IP): Performed by: INTERNAL MEDICINE

## 2021-10-31 PROCEDURE — 94640 AIRWAY INHALATION TREATMENT: CPT

## 2021-10-31 PROCEDURE — 6370000000 HC RX 637 (ALT 250 FOR IP): Performed by: NURSE PRACTITIONER

## 2021-10-31 PROCEDURE — 87635 SARS-COV-2 COVID-19 AMP PRB: CPT

## 2021-10-31 PROCEDURE — 6360000002 HC RX W HCPCS: Performed by: INTERNAL MEDICINE

## 2021-10-31 RX ORDER — CARVEDILOL 3.12 MG/1
3.12 TABLET ORAL 2 TIMES DAILY WITH MEALS
Qty: 60 TABLET | Refills: 3
Start: 2021-10-31

## 2021-10-31 RX ORDER — FERROUS SULFATE 325(65) MG
325 TABLET ORAL
Qty: 30 TABLET | Refills: 3
Start: 2021-11-01

## 2021-10-31 RX ORDER — ASPIRIN 81 MG/1
81 TABLET, CHEWABLE ORAL DAILY
Qty: 30 TABLET | Refills: 3
Start: 2021-11-01

## 2021-10-31 RX ADMIN — SODIUM CHLORIDE, PRESERVATIVE FREE 10 ML: 5 INJECTION INTRAVENOUS at 07:42

## 2021-10-31 RX ADMIN — FERROUS SULFATE TAB 325 MG (65 MG ELEMENTAL FE) 325 MG: 325 (65 FE) TAB at 07:43

## 2021-10-31 RX ADMIN — TIOTROPIUM BROMIDE INHALATION SPRAY 2 PUFF: 3.12 SPRAY, METERED RESPIRATORY (INHALATION) at 09:01

## 2021-10-31 RX ADMIN — Medication 2 PUFF: at 09:01

## 2021-10-31 RX ADMIN — CARVEDILOL 3.12 MG: 3.12 TABLET, FILM COATED ORAL at 07:43

## 2021-10-31 RX ADMIN — ASPIRIN 81 MG: 81 TABLET, CHEWABLE ORAL at 07:43

## 2021-10-31 RX ADMIN — LEVETIRACETAM 250 MG: 250 TABLET ORAL at 07:43

## 2021-10-31 RX ADMIN — POLYETHYLENE GLYCOL 3350 17 G: 17 POWDER, FOR SOLUTION ORAL at 07:42

## 2021-10-31 RX ADMIN — ENOXAPARIN SODIUM 30 MG: 30 INJECTION SUBCUTANEOUS at 07:43

## 2021-10-31 RX ADMIN — ACETAMINOPHEN 650 MG: 325 TABLET ORAL at 07:49

## 2021-10-31 RX ADMIN — SODIUM CHLORIDE, PRESERVATIVE FREE 10 ML: 5 INJECTION INTRAVENOUS at 07:49

## 2021-10-31 RX ADMIN — GUAIFENESIN 600 MG: 600 TABLET, EXTENDED RELEASE ORAL at 07:43

## 2021-10-31 RX ADMIN — TAMSULOSIN HYDROCHLORIDE 0.4 MG: 0.4 CAPSULE ORAL at 07:43

## 2021-10-31 ASSESSMENT — PAIN DESCRIPTION - LOCATION: LOCATION: FOOT

## 2021-10-31 ASSESSMENT — PAIN SCALES - GENERAL
PAINLEVEL_OUTOF10: 6
PAINLEVEL_OUTOF10: 0
PAINLEVEL_OUTOF10: 6

## 2021-10-31 NOTE — DISCHARGE SUMMARY
Hospital Medicine Discharge Summary    Patient ID: Mario Alberto Marcelino      Patient's PCP: Anya Amador MD    Admit Date: 10/26/2021     Discharge Date: 10/31/2021      Admitting Physician: Nanda Thorpe DO     Discharge Physician: Renu Pollard MD     Discharge Diagnoses: Active Hospital Problems    Diagnosis     Acute respiratory failure ([facility] [address] [J96.00]        The patient was seen and examined on day of discharge and this discharge summary is in conjunction with any daily progress note from day of discharge. Hospital Course:   80 y. o. female who presented to [facility] with past medical hypertension, hyperlipidemia, osteoporosis, seizure disorder, hypothyroidism, CAD, presents to the ED due to worsening acute respiratory failure.    Patient came from outside facility encephalopathic thus history is limited. Per chart review patient has been having worsening hypoxia satting in 60% when EMS arrived.  Patient was eval patient had consistent hypoxia however was responsive to oxygen.  On patient arrival patient was placed on BiPAP. Acute diastolic heart failure  - echo with EF 50%  - lasix gtt stopped. Will monitor off diuretic for now  - started on coreg     Acute hypoxic respiratory failure  - 2/2 above  - wean O2 as able  - PCT WNL. No need for abx  - COVID 19 PCR negative     Seizure  - likely with seizure episode on 10/29  - neurology consulted  - continue keppra  - MRI brain cancelled as patient declined further evaluation  - seizure precautions     Acute metabolic encephalopathy  - 2/2 above  - CT head negative  - supportive care     Afib with RVR  - HR improved with digoxin, diuresis  - started on ASA.  POA declined full AC due to bleeding risk  - echo ordered     Asthma  - no evidence of exacerbation  - continue home inhalers     Fe deficient anemia  - Fe supplement ordered  - continue to monitor     Severe protein calorie malnutrition  - dietitian consult  - supplements ordered     Hypothyroidism  - continue home synthroid    Physical Exam Performed:     BP (!) 107/57   Pulse 98   Temp 97.9 °F (36.6 °C) (Oral)   Resp 18   Ht 5' 6\" (1.676 m)   Wt 98 lb 8.7 oz (44.7 kg)   SpO2 95%   BMI 15.91 kg/m²       General appearance: No apparent distress, appears stated age and cooperative. HEENT: Pupils equal, round, and reactive to light. Conjunctivae/corneas clear. Neck: Supple, with full range of motion. No jugular venous distention. Trachea midline. Respiratory:  Normal respiratory effort. Rales bilaterally without Wheezes/Rhonchi. Cardiovascular: regular rate, irregular rhythm with normal S1/S2 without murmurs, rubs or gallops. Abdomen: Soft, non-tender, non-distended with normal bowel sounds. Musculoskeletal: No clubbing, cyanosis or edema bilaterally. Full range of motion without deformity. Skin: Skin color, texture, turgor normal.  No rashes or lesions. Neurologic:  Left sided weakness. Facial droop. Psychiatric: Alert but disoriented  Capillary Refill: Brisk,3 seconds, normal   Peripheral Pulses: +2 palpable, equal bilaterally     Labs: For convenience and continuity at follow-up the following most recent labs are provided:      CBC:    Lab Results   Component Value Date    WBC 7.5 10/29/2021    HGB 9.2 10/29/2021    HCT 29.6 10/29/2021     10/29/2021       Renal:    Lab Results   Component Value Date     10/30/2021    K 3.9 10/30/2021    CL 91 10/30/2021    CO2 30 10/30/2021    BUN 19 10/30/2021    CREATININE <0.5 10/30/2021    CALCIUM 8.9 10/30/2021         Significant Diagnostic Studies    Radiology:   CTA HEAD NECK W CONTRAST   Final Result   1. 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 However, evaluation in this   region is somewhat limited given streak artifact from ORIF hardware of the   left clavicle.    2. Moderate stenosis is seen involving the V3 segment of the right vertebral   artery. 3. Atherosclerosis contributes to less than 50% stenosis of the proximal   right cervical ICA by NASCET criteria. 4. There is moderate diffuse irregularity involving the left PCA branches. 5. Atherosclerosis contributes to mild stenosis of the proximal V4 segments   bilaterally. 6. Mild-to-moderate stenosis of the supraclinoid internal carotid arteries   bilaterally due to atherosclerosis. 7. Bilateral pleural effusions are seen with associated atelectasis. 8. Scarring in the lung apices bilaterally with pleural based calcifications. CT HEAD WO CONTRAST   Final Result   No hemorrhage or mass identified      Atrophy and small-vessel ischemic change, similar to prior      Results discussed with Steve Noyola by Yasmani Espinal. Naima Araujo MD at 9:56 am on   10/29/2021         CT HEAD WO CONTRAST   Final Result   No acute intracranial abnormality. CT CHEST PULMONARY EMBOLISM W CONTRAST   Final Result   1. No scan evidence for pulmonary embolus. 2. Bilateral pleural effusions with complete collapse of both lower lobes as   well as the middle lobe. XR CHEST PORTABLE   Final Result   Probable perihilar edema and CHF. Bilateral pleural effusions, right greater   than left with associated bibasilar volume loss. Underlying infiltrate not   excluded.                 Consults:     IP CONSULT TO HOSPITALIST  IP CONSULT TO HEART FAILURE NURSE/COORDINATOR  IP CONSULT TO DIETITIAN  PHARMACY TO DOSE VANCOMYCIN  IP CONSULT TO DIETITIAN  IP CONSULT TO NEUROLOGY    [facility]     Condition at Discharge: Stable    Discharge Instructions/Follow-up:  Follow up with PCP within 1-2 weeks    Code Status:  DNR-CCA     Activity: activity as tolerated    Diet: regular diet      Discharge Medications:     Discharge Medication List as of 10/31/2021 10:37 AM           Details   aspirin 81 MG chewable tablet Take 1 tablet by mouth daily, Disp-30 tablet, R-3NO PRINT carvedilol (COREG) 3.125 MG tablet Take 1 tablet by mouth 2 times daily (with meals), Disp-60 tablet, R-3NO PRINT      ferrous sulfate (IRON 325) 325 (65 Fe) MG tablet Take 1 tablet by mouth daily (with breakfast), Disp-30 tablet, R-3NO PRINT              Details   vitamin D (ERGOCALCIFEROL) 02089 UNITS capsule Take 1 capsule by mouth every 30 days, Disp-4 capsule, R-0      tamsulosin (FLOMAX) 0.4 MG capsule Take 1 capsule by mouth daily, Disp-30 capsule, R-3      levothyroxine (SYNTHROID) 50 MCG tablet Take 50 mcg by mouth nightly      alendronate (FOSAMAX) 70 MG tablet Take 70 mg by mouth every 7 days Every Saturday. fluticasone-salmeterol (ADVAIR) 250-50 MCG/DOSE AEPB Inhale 1 puff into the lungs 2 times daily      tiotropium (SPIRIVA) 18 MCG inhalation capsule Inhale 18 mcg into the lungs daily      albuterol sulfate  (90 BASE) MCG/ACT inhaler Inhale 2 puffs into the lungs every 6 hours as needed for Wheezing      guaiFENesin (MUCINEX) 600 MG extended release tablet Take 600 mg by mouth 2 times daily      melatonin 3 MG TABS tablet Take 2 mg by mouth daily      !! levETIRAcetam (KEPPRA) 250 MG tablet Take 250 mg by mouth daily      !! levETIRAcetam (KEPPRA) 250 MG tablet Take 500 mg by mouth nightly      montelukast (SINGULAIR) 10 MG tablet Take 10 mg by mouth nightly       !! - Potential duplicate medications found. Please discuss with provider. Time Spent on discharge is more than 30 minutes in the examination, evaluation, counseling and review of medications and discharge plan. Signed:    Nahid Archer MD   10/31/2021      Thank you Lisa Duque MD for the opportunity to be involved in this patient's care. If you have any questions or concerns please feel free to contact me at [phone].

## 2021-10-31 NOTE — CARE COORDINATION
CASE MANAGEMENT DISCHARGE SUMMARY      Discharge to: [facility] @ [facility] completed: [address] [facility] Exemption Notification (HENS) completed: N/A      Transportation:    Family/car: no   Medical Transport explained to [employer]. Pt/family voice no agency preference. Agency used: [facility]   time:   Ambulance form completed: Yes    Confirmed discharge plan with:     Patient: yes per RN     Family:  yes    Name:  Contact number:     Facility/Agency, name: [facility] faxed   Phone number for report to facility: [phone], name: Florentino Garcia    Note: Discharging nurse to complete JACQUELINE, reconcile AVS, and place final copy with patient's discharge packet. RN to ensure that written prescriptions for  Level II medications are sent with patient to the facility as per protocol.

## 2021-10-31 NOTE — DISCHARGE INSTR - COC
Continuity of Care Form    Patient Name: Johnnie De La Fuente   :  3/7/1929  MRN:  [id number]    [facility] date:  10/26/2021  Discharge date:  10/31/21    Code Status Order: DNR-CCA   Advance Directives:      Admitting Physician:  Cody Huynh DO  PCP: Ez Hawkins MD    Discharging Nurse: [facility] Unit/Room#: 7572/9717-49  Discharging Unit Phone Number: [phone]    Emergency Contact:   Extended Emergency Contact Information  Primary Emergency Contact: [address]  Mobile Phone: [phone]  Relation: Other  Secondary Emergency Contact: [address] [place] [address] Phone: [phone]  Relation: Child    Past Surgical History:  Past Surgical History:   Procedure Laterality Date    ANKLE SURGERY Left 10/27/2016    OPEN REDUCTION INTERNAL FIXATION BI MALLEOLAR FRACTURE LEFT ANKLE, REPAIR OF LACERATION LEFT LOWER LEG    FRACTURE SURGERY  10/27/2016    ORIF left ankle       Immunization History:   Immunization History   Administered Date(s) Administered    BCG (Navin BCG) 1993    Pneumococcal Polysaccharide (Tmtrtaznx41) 2000, 2006    Tdap Vaccine >6yo [facility] 2003       Active Problems:  Patient Active Problem List   Diagnosis Code    Closed bimalleolar fracture of left ankle S82.842A    Acute respiratory failure ([facility] [address] J96.00       Isolation/Infection:   Isolation            No Isolation          Patient Infection Status       Infection Onset Added Last Indicated Last Indicated By Review Planned Expiration Resolved Resolved By    None active    Resolved    COVID-19 Rule Out 10/26/21 10/26/21 10/26/21 COVID-19 (Ordered)   10/27/21 Rule-Out Test Resulted    COVID-19 Rule Out 10/26/21 10/26/21 10/26/21 COVID-19, Rapid (Ordered)   10/26/21 Rule-Out Test Resulted            Nurse Assessment:  Last Vital Signs: BP (!) 107/57   Pulse 98   Temp 97.9 °F (36.6 °C) (Oral)   Resp 18   Ht 5' 6\" (1.676 m)   Wt 98 lb 8.7 oz (44.7 kg)   SpO2 95%   BMI 15.91 kg/m²     Last Risk of Unplanned Readmission:  13           Discharging to Facility/ Agency   · Name: [facility]  · Address:  · Phone: [phone]  Fax:    Dialysis Facility (if applicable)   · Name:  · Address:  · Dialysis Schedule:  · Phone:  · Fax:    / signature: Electronically signed by Martin Saldana RN on 10/31/21 at 10:17 AM EDT    PHYSICIAN SECTION    Prognosis: Good    Condition at Discharge: Stable    Rehab Potential (if transferring to Rehab): Good    Recommended Labs or Other Treatments After Discharge:     Physician Certification: I certify the above information and transfer of Mao Malloy  is necessary for the continuing treatment of the diagnosis listed and that she requires Intermediate Nursing Care for greater 30 days.      Update Admission H&P: No change in H&P    PHYSICIAN SIGNATURE:  Electronically signed by Bridgette Fitzgerald MD on 10/31/21 at 10:03 AM EDT

## 2021-10-31 NOTE — PLAN OF CARE
Problem: Falls - Risk of:  Goal: Will remain free from falls  Description: Will remain free from falls  10/31/2021 0242 by Che Caro RN  Outcome: Ongoing  10/30/2021 1737 by Carmel Coe RN  Outcome: Ongoing  Goal: Absence of physical injury  Description: Absence of physical injury  10/31/2021 0242 by Che Caro RN  Outcome: Ongoing  10/30/2021 1737 by Carmel Coe RN  Outcome: Ongoing     Problem: Skin Integrity:  Goal: Will show no infection signs and symptoms  Description: Will show no infection signs and symptoms  10/31/2021 0242 by Che Caro RN  Outcome: Ongoing  10/30/2021 1737 by Carmel Coe RN  Outcome: Ongoing  Goal: Absence of new skin breakdown  Description: Absence of new skin breakdown  10/31/2021 0242 by Che Caro RN  Outcome: Ongoing  10/30/2021 1737 by Carmel Coe RN  Outcome: Ongoing     Problem: Nutrition  Goal: Optimal nutrition therapy  10/31/2021 0242 by Che Caro RN  Outcome: Ongoing  10/30/2021 1737 by Carmel oCe RN  Outcome: Ongoing

## 2021-10-31 NOTE — PROGRESS NOTES
Pt. Transferred via bed and 2 nurses from room 450 to room 352, bed alarm on and functioning, educated pt. On importance of bed alarm, bed in lowest position, call light within reach.

## 2021-10-31 NOTE — PROGRESS NOTES
Leslie removed without complication. IV removed without complication. Tele removed and returned. [facility] aware.